# Patient Record
Sex: FEMALE | Race: WHITE | Employment: OTHER | URBAN - METROPOLITAN AREA
[De-identification: names, ages, dates, MRNs, and addresses within clinical notes are randomized per-mention and may not be internally consistent; named-entity substitution may affect disease eponyms.]

---

## 2019-08-21 ENCOUNTER — TRANSFERRED RECORDS (OUTPATIENT)
Dept: HEALTH INFORMATION MANAGEMENT | Facility: CLINIC | Age: 79
End: 2019-08-21

## 2019-08-21 LAB
CREAT SERPL-MCNC: 0.58 MG/DL (ref 0.59–1.04)
GFR SERPL CREATININE-BSD FRML MDRD: 87 ML/MIN/1.73M2
GLUCOSE SERPL-MCNC: 96 MG/DL (ref 70–140)
POTASSIUM SERPL-SCNC: 4.3 MMOL/L (ref 3.6–5.2)

## 2019-08-26 RX ORDER — ACETAMINOPHEN 325 MG/1
975 TABLET ORAL ONCE
Status: CANCELLED | OUTPATIENT
Start: 2019-08-26

## 2019-09-10 ENCOUNTER — ANESTHESIA EVENT (OUTPATIENT)
Dept: SURGERY | Facility: CLINIC | Age: 79
DRG: 483 | End: 2019-09-10
Payer: COMMERCIAL

## 2019-09-10 ASSESSMENT — ENCOUNTER SYMPTOMS: DYSRHYTHMIAS: 1

## 2019-09-11 ENCOUNTER — APPOINTMENT (OUTPATIENT)
Dept: GENERAL RADIOLOGY | Facility: CLINIC | Age: 79
DRG: 483 | End: 2019-09-11
Attending: ORTHOPAEDIC SURGERY
Payer: COMMERCIAL

## 2019-09-11 ENCOUNTER — HOSPITAL ENCOUNTER (INPATIENT)
Facility: CLINIC | Age: 79
LOS: 2 days | Discharge: HOME HEALTH | DRG: 483 | End: 2019-09-13
Attending: ORTHOPAEDIC SURGERY | Admitting: ORTHOPAEDIC SURGERY
Payer: COMMERCIAL

## 2019-09-11 ENCOUNTER — ANESTHESIA (OUTPATIENT)
Dept: SURGERY | Facility: CLINIC | Age: 79
DRG: 483 | End: 2019-09-11
Payer: COMMERCIAL

## 2019-09-11 DIAGNOSIS — Z98.890 STATUS POST SHOULDER SURGERY: Primary | ICD-10-CM

## 2019-09-11 DIAGNOSIS — I10 ESSENTIAL HYPERTENSION: ICD-10-CM

## 2019-09-11 LAB
ABO + RH BLD: NORMAL
ABO + RH BLD: NORMAL
BLD GP AB SCN SERPL QL: NORMAL
BLOOD BANK CMNT PATIENT-IMP: NORMAL
GLUCOSE SERPL-MCNC: 99 MG/DL (ref 70–99)
SPECIMEN EXP DATE BLD: NORMAL

## 2019-09-11 PROCEDURE — 82947 ASSAY GLUCOSE BLOOD QUANT: CPT | Performed by: ANESTHESIOLOGY

## 2019-09-11 PROCEDURE — 25800030 ZZH RX IP 258 OP 636: Performed by: NURSE ANESTHETIST, CERTIFIED REGISTERED

## 2019-09-11 PROCEDURE — 25800030 ZZH RX IP 258 OP 636: Performed by: ORTHOPAEDIC SURGERY

## 2019-09-11 PROCEDURE — 86900 BLOOD TYPING SEROLOGIC ABO: CPT | Performed by: ORTHOPAEDIC SURGERY

## 2019-09-11 PROCEDURE — 99207 ZZC CONSULT E&M CHANGED TO INITIAL LEVEL: CPT | Performed by: INTERNAL MEDICINE

## 2019-09-11 PROCEDURE — 12000001 ZZH R&B MED SURG/OB UMMC

## 2019-09-11 PROCEDURE — 36000062 ZZH SURGERY LEVEL 4 1ST 30 MIN - UMMC: Performed by: ORTHOPAEDIC SURGERY

## 2019-09-11 PROCEDURE — C1713 ANCHOR/SCREW BN/BN,TIS/BN: HCPCS | Performed by: ORTHOPAEDIC SURGERY

## 2019-09-11 PROCEDURE — 25800025 ZZH RX 258: Performed by: ORTHOPAEDIC SURGERY

## 2019-09-11 PROCEDURE — 37000008 ZZH ANESTHESIA TECHNICAL FEE, 1ST 30 MIN: Performed by: ORTHOPAEDIC SURGERY

## 2019-09-11 PROCEDURE — 25000125 ZZHC RX 250: Performed by: ORTHOPAEDIC SURGERY

## 2019-09-11 PROCEDURE — 25000128 H RX IP 250 OP 636: Performed by: ORTHOPAEDIC SURGERY

## 2019-09-11 PROCEDURE — 25000125 ZZHC RX 250: Performed by: NURSE ANESTHETIST, CERTIFIED REGISTERED

## 2019-09-11 PROCEDURE — 25800030 ZZH RX IP 258 OP 636: Performed by: ANESTHESIOLOGY

## 2019-09-11 PROCEDURE — 25000132 ZZH RX MED GY IP 250 OP 250 PS 637: Performed by: ORTHOPAEDIC SURGERY

## 2019-09-11 PROCEDURE — 25000566 ZZH SEVOFLURANE, EA 15 MIN: Performed by: ORTHOPAEDIC SURGERY

## 2019-09-11 PROCEDURE — 40000986 XR SHOULDER RT PORT G/E 2 VW: Mod: RT

## 2019-09-11 PROCEDURE — 86850 RBC ANTIBODY SCREEN: CPT | Performed by: ORTHOPAEDIC SURGERY

## 2019-09-11 PROCEDURE — 25000128 H RX IP 250 OP 636: Performed by: ANESTHESIOLOGY

## 2019-09-11 PROCEDURE — 37000009 ZZH ANESTHESIA TECHNICAL FEE, EACH ADDTL 15 MIN: Performed by: ORTHOPAEDIC SURGERY

## 2019-09-11 PROCEDURE — C9290 INJ, BUPIVACAINE LIPOSOME: HCPCS | Performed by: ANESTHESIOLOGY

## 2019-09-11 PROCEDURE — 71000014 ZZH RECOVERY PHASE 1 LEVEL 2 FIRST HR: Performed by: ORTHOPAEDIC SURGERY

## 2019-09-11 PROCEDURE — 25000132 ZZH RX MED GY IP 250 OP 250 PS 637: Performed by: ANESTHESIOLOGY

## 2019-09-11 PROCEDURE — 25000132 ZZH RX MED GY IP 250 OP 250 PS 637: Performed by: INTERNAL MEDICINE

## 2019-09-11 PROCEDURE — 99222 1ST HOSP IP/OBS MODERATE 55: CPT | Performed by: INTERNAL MEDICINE

## 2019-09-11 PROCEDURE — 36000064 ZZH SURGERY LEVEL 4 EA 15 ADDTL MIN - UMMC: Performed by: ORTHOPAEDIC SURGERY

## 2019-09-11 PROCEDURE — P9041 ALBUMIN (HUMAN),5%, 50ML: HCPCS | Performed by: NURSE ANESTHETIST, CERTIFIED REGISTERED

## 2019-09-11 PROCEDURE — 27110028 ZZH OR GENERAL SUPPLY NON-STERILE: Performed by: ORTHOPAEDIC SURGERY

## 2019-09-11 PROCEDURE — 0RRJ00Z REPLACEMENT OF RIGHT SHOULDER JOINT WITH REVERSE BALL AND SOCKET SYNTHETIC SUBSTITUTE, OPEN APPROACH: ICD-10-PCS | Performed by: ORTHOPAEDIC SURGERY

## 2019-09-11 PROCEDURE — 86901 BLOOD TYPING SEROLOGIC RH(D): CPT | Performed by: ORTHOPAEDIC SURGERY

## 2019-09-11 PROCEDURE — C1776 JOINT DEVICE (IMPLANTABLE): HCPCS | Performed by: ORTHOPAEDIC SURGERY

## 2019-09-11 PROCEDURE — 40000171 ZZH STATISTIC PRE-PROCEDURE ASSESSMENT III: Performed by: ORTHOPAEDIC SURGERY

## 2019-09-11 PROCEDURE — 27211024 ZZHC OR SUPPLY OTHER OPNP: Performed by: ORTHOPAEDIC SURGERY

## 2019-09-11 PROCEDURE — 27210794 ZZH OR GENERAL SUPPLY STERILE: Performed by: ORTHOPAEDIC SURGERY

## 2019-09-11 PROCEDURE — 25000128 H RX IP 250 OP 636: Performed by: NURSE ANESTHETIST, CERTIFIED REGISTERED

## 2019-09-11 DEVICE — IMP SCR LOCKING BIOM REV SHLDR 3.5 HEX 4.75X20MM 180551: Type: IMPLANTABLE DEVICE | Site: SHOULDER | Status: FUNCTIONAL

## 2019-09-11 DEVICE — IMP SCR LOCKING BIOM REV SHLDR 3.5 HEX 4.75X15MM 180550: Type: IMPLANTABLE DEVICE | Site: SHOULDER | Status: FUNCTIONAL

## 2019-09-11 DEVICE — IMPLANTABLE DEVICE: Type: IMPLANTABLE DEVICE | Site: SHOULDER | Status: FUNCTIONAL

## 2019-09-11 DEVICE — IMP BASEPLATE MINI GLENOSPHERE BIOM REV SHLDR 25MM 010000589: Type: IMPLANTABLE DEVICE | Site: SHOULDER | Status: FUNCTIONAL

## 2019-09-11 DEVICE — IMP SCR LOCKING BIOM REV SHLDR 3.5 HEX 4.75X35MM 180554: Type: IMPLANTABLE DEVICE | Site: SHOULDER | Status: FUNCTIONAL

## 2019-09-11 DEVICE — IMP SCR LOCKING BIOM REV SHLDR 3.5 HEX 4.75X30MM 180553: Type: IMPLANTABLE DEVICE | Site: SHOULDER | Status: FUNCTIONAL

## 2019-09-11 RX ORDER — BETAMETHASONE DIPROPIONATE 0.5 MG/G
CREAM TOPICAL 2 TIMES DAILY PRN
COMMUNITY

## 2019-09-11 RX ORDER — LIDOCAINE 40 MG/G
CREAM TOPICAL
Status: DISCONTINUED | OUTPATIENT
Start: 2019-09-11 | End: 2019-09-11 | Stop reason: HOSPADM

## 2019-09-11 RX ORDER — ONDANSETRON 2 MG/ML
4 INJECTION INTRAMUSCULAR; INTRAVENOUS EVERY 6 HOURS PRN
Status: DISCONTINUED | OUTPATIENT
Start: 2019-09-11 | End: 2019-09-13 | Stop reason: HOSPADM

## 2019-09-11 RX ORDER — GABAPENTIN 100 MG/1
200 CAPSULE ORAL
Status: COMPLETED | OUTPATIENT
Start: 2019-09-11 | End: 2019-09-11

## 2019-09-11 RX ORDER — CLOTRIMAZOLE 1 %
CREAM (GRAM) TOPICAL 2 TIMES DAILY PRN
COMMUNITY

## 2019-09-11 RX ORDER — AMOXICILLIN 250 MG
1 CAPSULE ORAL 2 TIMES DAILY
Status: DISCONTINUED | OUTPATIENT
Start: 2019-09-11 | End: 2019-09-13 | Stop reason: HOSPADM

## 2019-09-11 RX ORDER — SODIUM CHLORIDE, SODIUM LACTATE, POTASSIUM CHLORIDE, CALCIUM CHLORIDE 600; 310; 30; 20 MG/100ML; MG/100ML; MG/100ML; MG/100ML
INJECTION, SOLUTION INTRAVENOUS CONTINUOUS
Status: DISCONTINUED | OUTPATIENT
Start: 2019-09-11 | End: 2019-09-11 | Stop reason: HOSPADM

## 2019-09-11 RX ORDER — NALOXONE HYDROCHLORIDE 0.4 MG/ML
.1-.4 INJECTION, SOLUTION INTRAMUSCULAR; INTRAVENOUS; SUBCUTANEOUS
Status: DISCONTINUED | OUTPATIENT
Start: 2019-09-11 | End: 2019-09-13 | Stop reason: HOSPADM

## 2019-09-11 RX ORDER — ACETAMINOPHEN 325 MG/1
975 TABLET ORAL EVERY 8 HOURS
Status: DISCONTINUED | OUTPATIENT
Start: 2019-09-11 | End: 2019-09-13 | Stop reason: HOSPADM

## 2019-09-11 RX ORDER — ALBUMIN, HUMAN INJ 5% 5 %
SOLUTION INTRAVENOUS CONTINUOUS PRN
Status: DISCONTINUED | OUTPATIENT
Start: 2019-09-11 | End: 2019-09-11

## 2019-09-11 RX ORDER — METOCLOPRAMIDE 5 MG/1
5 TABLET ORAL EVERY 6 HOURS PRN
Status: DISCONTINUED | OUTPATIENT
Start: 2019-09-11 | End: 2019-09-13 | Stop reason: HOSPADM

## 2019-09-11 RX ORDER — AMOXICILLIN 250 MG
2 CAPSULE ORAL 2 TIMES DAILY
Status: DISCONTINUED | OUTPATIENT
Start: 2019-09-11 | End: 2019-09-13 | Stop reason: HOSPADM

## 2019-09-11 RX ORDER — SODIUM CHLORIDE 9 MG/ML
INJECTION, SOLUTION INTRAVENOUS CONTINUOUS
Status: DISCONTINUED | OUTPATIENT
Start: 2019-09-11 | End: 2019-09-13 | Stop reason: HOSPADM

## 2019-09-11 RX ORDER — LIDOCAINE 40 MG/G
CREAM TOPICAL
Status: DISCONTINUED | OUTPATIENT
Start: 2019-09-11 | End: 2019-09-13 | Stop reason: HOSPADM

## 2019-09-11 RX ORDER — HYDROMORPHONE HYDROCHLORIDE 1 MG/ML
.3-.5 INJECTION, SOLUTION INTRAMUSCULAR; INTRAVENOUS; SUBCUTANEOUS EVERY 10 MIN PRN
Status: DISCONTINUED | OUTPATIENT
Start: 2019-09-11 | End: 2019-09-11 | Stop reason: HOSPADM

## 2019-09-11 RX ORDER — OXYCODONE HYDROCHLORIDE 5 MG/1
5-10 TABLET ORAL EVERY 4 HOURS PRN
Status: DISCONTINUED | OUTPATIENT
Start: 2019-09-11 | End: 2019-09-12

## 2019-09-11 RX ORDER — DILTIAZEM HYDROCHLORIDE 180 MG/1
180 CAPSULE, EXTENDED RELEASE ORAL AT BEDTIME
Status: ON HOLD | COMMUNITY
Start: 2013-07-02 | End: 2019-09-13

## 2019-09-11 RX ORDER — ATORVASTATIN CALCIUM 20 MG/1
20 TABLET, FILM COATED ORAL AT BEDTIME
Status: DISCONTINUED | OUTPATIENT
Start: 2019-09-11 | End: 2019-09-13 | Stop reason: HOSPADM

## 2019-09-11 RX ORDER — DIMENHYDRINATE 50 MG/ML
25 INJECTION, SOLUTION INTRAMUSCULAR; INTRAVENOUS
Status: DISCONTINUED | OUTPATIENT
Start: 2019-09-11 | End: 2019-09-11 | Stop reason: HOSPADM

## 2019-09-11 RX ORDER — NALOXONE HYDROCHLORIDE 0.4 MG/ML
.1-.4 INJECTION, SOLUTION INTRAMUSCULAR; INTRAVENOUS; SUBCUTANEOUS
Status: DISCONTINUED | OUTPATIENT
Start: 2019-09-11 | End: 2019-09-11 | Stop reason: HOSPADM

## 2019-09-11 RX ORDER — FENTANYL CITRATE 50 UG/ML
INJECTION, SOLUTION INTRAMUSCULAR; INTRAVENOUS PRN
Status: DISCONTINUED | OUTPATIENT
Start: 2019-09-11 | End: 2019-09-11

## 2019-09-11 RX ORDER — CLOBETASOL PROPIONATE 0.5 MG/ML
SOLUTION TOPICAL DAILY PRN
COMMUNITY

## 2019-09-11 RX ORDER — ONDANSETRON 2 MG/ML
INJECTION INTRAMUSCULAR; INTRAVENOUS PRN
Status: DISCONTINUED | OUTPATIENT
Start: 2019-09-11 | End: 2019-09-11

## 2019-09-11 RX ORDER — IBUPROFEN 200 MG
400 TABLET ORAL 2 TIMES DAILY
COMMUNITY

## 2019-09-11 RX ORDER — ACETAMINOPHEN 325 MG/1
975 TABLET ORAL
Status: COMPLETED | OUTPATIENT
Start: 2019-09-11 | End: 2019-09-11

## 2019-09-11 RX ORDER — KETOCONAZOLE 20 MG/ML
SHAMPOO TOPICAL DAILY PRN
Refills: 10 | COMMUNITY
Start: 2019-08-13

## 2019-09-11 RX ORDER — HYDROXYZINE HYDROCHLORIDE 10 MG/1
10 TABLET, FILM COATED ORAL EVERY 6 HOURS PRN
Status: DISCONTINUED | OUTPATIENT
Start: 2019-09-11 | End: 2019-09-13 | Stop reason: HOSPADM

## 2019-09-11 RX ORDER — DEXAMETHASONE SODIUM PHOSPHATE 4 MG/ML
INJECTION, SOLUTION INTRA-ARTICULAR; INTRALESIONAL; INTRAMUSCULAR; INTRAVENOUS; SOFT TISSUE PRN
Status: DISCONTINUED | OUTPATIENT
Start: 2019-09-11 | End: 2019-09-11

## 2019-09-11 RX ORDER — ACETAMINOPHEN 325 MG/1
650 TABLET ORAL EVERY 4 HOURS PRN
Status: DISCONTINUED | OUTPATIENT
Start: 2019-09-14 | End: 2019-09-13 | Stop reason: HOSPADM

## 2019-09-11 RX ORDER — FLUMAZENIL 0.1 MG/ML
0.2 INJECTION, SOLUTION INTRAVENOUS
Status: DISCONTINUED | OUTPATIENT
Start: 2019-09-11 | End: 2019-09-11 | Stop reason: HOSPADM

## 2019-09-11 RX ORDER — BETAMETHASONE DIPROPIONATE 0.5 MG/G
CREAM TOPICAL
Refills: 3 | Status: ON HOLD | COMMUNITY
Start: 2019-04-02 | End: 2019-09-11

## 2019-09-11 RX ORDER — FENTANYL CITRATE 50 UG/ML
25-50 INJECTION, SOLUTION INTRAMUSCULAR; INTRAVENOUS
Status: DISCONTINUED | OUTPATIENT
Start: 2019-09-11 | End: 2019-09-11 | Stop reason: HOSPADM

## 2019-09-11 RX ORDER — DOXYCYCLINE 100 MG/1
CAPSULE ORAL
Refills: 0 | Status: ON HOLD | COMMUNITY
Start: 2019-05-07 | End: 2019-09-11

## 2019-09-11 RX ORDER — HYDRALAZINE HYDROCHLORIDE 20 MG/ML
2.5-5 INJECTION INTRAMUSCULAR; INTRAVENOUS EVERY 10 MIN PRN
Status: DISCONTINUED | OUTPATIENT
Start: 2019-09-11 | End: 2019-09-11 | Stop reason: HOSPADM

## 2019-09-11 RX ORDER — GLYCOPYRROLATE 0.2 MG/ML
INJECTION, SOLUTION INTRAMUSCULAR; INTRAVENOUS PRN
Status: DISCONTINUED | OUTPATIENT
Start: 2019-09-11 | End: 2019-09-11

## 2019-09-11 RX ORDER — ALBUTEROL SULFATE 0.83 MG/ML
2.5 SOLUTION RESPIRATORY (INHALATION) EVERY 4 HOURS PRN
Status: DISCONTINUED | OUTPATIENT
Start: 2019-09-11 | End: 2019-09-11 | Stop reason: HOSPADM

## 2019-09-11 RX ORDER — MEPERIDINE HYDROCHLORIDE 25 MG/ML
12.5 INJECTION INTRAMUSCULAR; INTRAVENOUS; SUBCUTANEOUS
Status: DISCONTINUED | OUTPATIENT
Start: 2019-09-11 | End: 2019-09-11 | Stop reason: HOSPADM

## 2019-09-11 RX ORDER — METOPROLOL TARTRATE 1 MG/ML
1-2 INJECTION, SOLUTION INTRAVENOUS EVERY 5 MIN PRN
Status: DISCONTINUED | OUTPATIENT
Start: 2019-09-11 | End: 2019-09-11 | Stop reason: HOSPADM

## 2019-09-11 RX ORDER — CEFAZOLIN SODIUM 1 G/3ML
1 INJECTION, POWDER, FOR SOLUTION INTRAMUSCULAR; INTRAVENOUS SEE ADMIN INSTRUCTIONS
Status: DISCONTINUED | OUTPATIENT
Start: 2019-09-11 | End: 2019-09-11 | Stop reason: HOSPADM

## 2019-09-11 RX ORDER — ONDANSETRON 4 MG/1
4 TABLET, ORALLY DISINTEGRATING ORAL EVERY 6 HOURS PRN
Status: DISCONTINUED | OUTPATIENT
Start: 2019-09-11 | End: 2019-09-13 | Stop reason: HOSPADM

## 2019-09-11 RX ORDER — DIPHENHYDRAMINE HCL 25 MG
25 TABLET ORAL
Status: ON HOLD | COMMUNITY
Start: 2016-08-24 | End: 2019-09-11

## 2019-09-11 RX ORDER — ONDANSETRON 2 MG/ML
4 INJECTION INTRAMUSCULAR; INTRAVENOUS EVERY 30 MIN PRN
Status: DISCONTINUED | OUTPATIENT
Start: 2019-09-11 | End: 2019-09-11 | Stop reason: HOSPADM

## 2019-09-11 RX ORDER — ATORVASTATIN CALCIUM 20 MG/1
20 TABLET, FILM COATED ORAL AT BEDTIME
COMMUNITY
Start: 2018-10-31

## 2019-09-11 RX ORDER — ONDANSETRON 4 MG/1
4 TABLET, ORALLY DISINTEGRATING ORAL EVERY 30 MIN PRN
Status: DISCONTINUED | OUTPATIENT
Start: 2019-09-11 | End: 2019-09-11 | Stop reason: HOSPADM

## 2019-09-11 RX ORDER — BUPIVACAINE HYDROCHLORIDE 2.5 MG/ML
INJECTION, SOLUTION EPIDURAL; INFILTRATION; INTRACAUDAL PRN
Status: DISCONTINUED | OUTPATIENT
Start: 2019-09-11 | End: 2019-09-11

## 2019-09-11 RX ORDER — CLOTRIMAZOLE 1 %
CREAM (GRAM) TOPICAL
Refills: 0 | Status: ON HOLD | COMMUNITY
Start: 2019-01-28 | End: 2019-09-11

## 2019-09-11 RX ORDER — CEFAZOLIN SODIUM 1 G/3ML
1 INJECTION, POWDER, FOR SOLUTION INTRAMUSCULAR; INTRAVENOUS EVERY 8 HOURS
Status: COMPLETED | OUTPATIENT
Start: 2019-09-11 | End: 2019-09-12

## 2019-09-11 RX ORDER — DOCUSATE SODIUM 100 MG/1
100 CAPSULE, LIQUID FILLED ORAL
Status: ON HOLD | COMMUNITY
Start: 2016-08-24 | End: 2019-09-11

## 2019-09-11 RX ORDER — OLOPATADINE HYDROCHLORIDE 2 MG/ML
SOLUTION/ DROPS OPHTHALMIC
Status: ON HOLD | COMMUNITY
Start: 2014-05-14 | End: 2019-09-11

## 2019-09-11 RX ORDER — METOCLOPRAMIDE HYDROCHLORIDE 5 MG/ML
5 INJECTION INTRAMUSCULAR; INTRAVENOUS EVERY 6 HOURS PRN
Status: DISCONTINUED | OUTPATIENT
Start: 2019-09-11 | End: 2019-09-13 | Stop reason: HOSPADM

## 2019-09-11 RX ORDER — ACETAMINOPHEN 500 MG
500 TABLET ORAL 2 TIMES DAILY
Status: ON HOLD | COMMUNITY
Start: 2017-05-08 | End: 2019-09-13

## 2019-09-11 RX ORDER — LIDOCAINE HYDROCHLORIDE 20 MG/ML
INJECTION, SOLUTION INFILTRATION; PERINEURAL PRN
Status: DISCONTINUED | OUTPATIENT
Start: 2019-09-11 | End: 2019-09-11

## 2019-09-11 RX ORDER — CEFAZOLIN SODIUM 2 G/100ML
2 INJECTION, SOLUTION INTRAVENOUS
Status: COMPLETED | OUTPATIENT
Start: 2019-09-11 | End: 2019-09-11

## 2019-09-11 RX ORDER — PROPOFOL 10 MG/ML
INJECTION, EMULSION INTRAVENOUS PRN
Status: DISCONTINUED | OUTPATIENT
Start: 2019-09-11 | End: 2019-09-11

## 2019-09-11 RX ORDER — ESTRADIOL 0.1 MG/G
2 CREAM VAGINAL DAILY PRN
COMMUNITY
Start: 2018-02-26

## 2019-09-11 RX ORDER — ASPIRIN 81 MG/1
81 TABLET ORAL DAILY
COMMUNITY
Start: 2015-10-20

## 2019-09-11 RX ORDER — CLOBETASOL PROPIONATE 0.5 MG/ML
SOLUTION TOPICAL
Refills: 3 | Status: ON HOLD | COMMUNITY
Start: 2019-04-02 | End: 2019-09-11

## 2019-09-11 RX ORDER — DILTIAZEM HYDROCHLORIDE 120 MG/1
120 CAPSULE, EXTENDED RELEASE ORAL AT BEDTIME
Status: DISCONTINUED | OUTPATIENT
Start: 2019-09-11 | End: 2019-09-13 | Stop reason: HOSPADM

## 2019-09-11 RX ORDER — IBUPROFEN 600 MG/1
600 TABLET, FILM COATED ORAL EVERY 6 HOURS PRN
Status: DISCONTINUED | OUTPATIENT
Start: 2019-09-11 | End: 2019-09-13 | Stop reason: HOSPADM

## 2019-09-11 RX ADMIN — OXYCODONE HYDROCHLORIDE 5 MG: 5 TABLET ORAL at 20:50

## 2019-09-11 RX ADMIN — SODIUM CHLORIDE: 9 INJECTION, SOLUTION INTRAVENOUS at 15:30

## 2019-09-11 RX ADMIN — PROPOFOL 50 MG: 10 INJECTION, EMULSION INTRAVENOUS at 10:03

## 2019-09-11 RX ADMIN — OXYCODONE HYDROCHLORIDE 5 MG: 5 TABLET ORAL at 15:30

## 2019-09-11 RX ADMIN — GLYCOPYRROLATE 0.2 MG: 0.2 INJECTION, SOLUTION INTRAMUSCULAR; INTRAVENOUS at 10:42

## 2019-09-11 RX ADMIN — ALBUMIN HUMAN: 0.05 INJECTION, SOLUTION INTRAVENOUS at 11:15

## 2019-09-11 RX ADMIN — CEFAZOLIN SODIUM 1 G: 1 INJECTION, POWDER, FOR SOLUTION INTRAMUSCULAR; INTRAVENOUS at 19:40

## 2019-09-11 RX ADMIN — ATORVASTATIN CALCIUM 20 MG: 20 TABLET, FILM COATED ORAL at 20:50

## 2019-09-11 RX ADMIN — BENZOCAINE, MENTHOL 1 LOZENGE: 15; 3.6 LOZENGE ORAL at 20:52

## 2019-09-11 RX ADMIN — BENZOCAINE, MENTHOL 1 LOZENGE: 15; 3.6 LOZENGE ORAL at 16:54

## 2019-09-11 RX ADMIN — FENTANYL CITRATE 50 MCG: 50 INJECTION INTRAMUSCULAR; INTRAVENOUS at 08:51

## 2019-09-11 RX ADMIN — DEXMEDETOMIDINE HYDROCHLORIDE 20 MCG: 100 INJECTION, SOLUTION INTRAVENOUS at 12:12

## 2019-09-11 RX ADMIN — CEFAZOLIN 1 G: 1 INJECTION, POWDER, FOR SOLUTION INTRAMUSCULAR; INTRAVENOUS at 12:04

## 2019-09-11 RX ADMIN — SODIUM CHLORIDE 1 G: 9 INJECTION, SOLUTION INTRAVENOUS at 10:10

## 2019-09-11 RX ADMIN — ACETAMINOPHEN 975 MG: 325 TABLET, FILM COATED ORAL at 07:44

## 2019-09-11 RX ADMIN — SODIUM CHLORIDE, POTASSIUM CHLORIDE, SODIUM LACTATE AND CALCIUM CHLORIDE: 600; 310; 30; 20 INJECTION, SOLUTION INTRAVENOUS at 09:49

## 2019-09-11 RX ADMIN — FENTANYL CITRATE 50 MCG: 50 INJECTION, SOLUTION INTRAMUSCULAR; INTRAVENOUS at 10:07

## 2019-09-11 RX ADMIN — Medication 100 MG: at 09:58

## 2019-09-11 RX ADMIN — PHENYLEPHRINE HYDROCHLORIDE: 10 INJECTION INTRAVENOUS at 10:56

## 2019-09-11 RX ADMIN — BUPIVACAINE 10 ML: 13.3 INJECTION, SUSPENSION, LIPOSOMAL INFILTRATION at 09:14

## 2019-09-11 RX ADMIN — CEFAZOLIN SODIUM 2 G: 2 INJECTION, SOLUTION INTRAVENOUS at 10:05

## 2019-09-11 RX ADMIN — PHENYLEPHRINE HYDROCHLORIDE 0.2 MCG/KG/MIN: 10 INJECTION INTRAVENOUS at 10:24

## 2019-09-11 RX ADMIN — ACETAMINOPHEN 975 MG: 325 TABLET, FILM COATED ORAL at 15:30

## 2019-09-11 RX ADMIN — BUPIVACAINE HYDROCHLORIDE 10 ML: 2.5 INJECTION, SOLUTION EPIDURAL; INFILTRATION; INTRACAUDAL at 09:14

## 2019-09-11 RX ADMIN — FENTANYL CITRATE 50 MCG: 50 INJECTION, SOLUTION INTRAMUSCULAR; INTRAVENOUS at 09:58

## 2019-09-11 RX ADMIN — PROPOFOL 120 MG: 10 INJECTION, EMULSION INTRAVENOUS at 09:58

## 2019-09-11 RX ADMIN — ALBUMIN HUMAN: 0.05 INJECTION, SOLUTION INTRAVENOUS at 11:38

## 2019-09-11 RX ADMIN — DILTIAZEM HYDROCHLORIDE 120 MG: 120 CAPSULE, COATED, EXTENDED RELEASE ORAL at 22:09

## 2019-09-11 RX ADMIN — LIDOCAINE HYDROCHLORIDE 100 MG: 20 INJECTION, SOLUTION INFILTRATION; PERINEURAL at 09:58

## 2019-09-11 RX ADMIN — GABAPENTIN 200 MG: 100 CAPSULE ORAL at 07:44

## 2019-09-11 RX ADMIN — DEXAMETHASONE SODIUM PHOSPHATE 4 MG: 4 INJECTION, SOLUTION INTRAMUSCULAR; INTRAVENOUS at 10:30

## 2019-09-11 RX ADMIN — ONDANSETRON 4 MG: 2 INJECTION INTRAMUSCULAR; INTRAVENOUS at 11:57

## 2019-09-11 ASSESSMENT — ACTIVITIES OF DAILY LIVING (ADL)
AMBULATION: 0-->INDEPENDENT
TOILETING: 0-->INDEPENDENT
COGNITION: 0 - NO COGNITION ISSUES REPORTED
ADLS_ACUITY_SCORE: 14
DRESS: 0-->INDEPENDENT
RETIRED_COMMUNICATION: 0-->UNDERSTANDS/COMMUNICATES WITHOUT DIFFICULTY
ADLS_ACUITY_SCORE: 13
BATHING: 0-->INDEPENDENT
TRANSFERRING: 0-->INDEPENDENT
RETIRED_EATING: 0-->INDEPENDENT
SWALLOWING: 0-->SWALLOWS FOODS/LIQUIDS WITHOUT DIFFICULTY
FALL_HISTORY_WITHIN_LAST_SIX_MONTHS: NO

## 2019-09-11 ASSESSMENT — COPD QUESTIONNAIRES: COPD: 1

## 2019-09-11 ASSESSMENT — MIFFLIN-ST. JEOR: SCORE: 1204

## 2019-09-11 NOTE — ANESTHESIA CARE TRANSFER NOTE
Patient: Jocelin Moreno    Procedure(s):  Reverse Total Shoulder Arthroplasty    Diagnosis: Glenohumeral Osteoarthritis  Diagnosis Additional Information: No value filed.    Anesthesia Type:   General, Peripheral Nerve Block     Note:  Airway :Face Mask  Patient transferred to:PACU  Comments: 108/51, sat 99%, HR 66, RR 14, T 36.1Handoff Report: Identifed the Patient, Identified the Reponsible Provider, Reviewed the pertinent medical history, Discussed the surgical course, Reviewed Intra-OP anesthesia mangement and issues during anesthesia, Set expectations for post-procedure period and Allowed opportunity for questions and acknowledgement of understanding      Vitals: (Last set prior to Anesthesia Care Transfer)    CRNA VITALS  9/11/2019 1159 - 9/11/2019 1242      9/11/2019             EKG:  Sinus rhythm                Electronically Signed By: JAKY Mendieta CRNA  September 11, 2019  12:42 PM

## 2019-09-11 NOTE — PLAN OF CARE
Pt arrived to unit at 1400 and was oriented to room and call light  VS: VSS   O2: >90% on RA   Output: Adequate in BR; last    Last BM: 9/10   Activity: 1A. Ambulated in room   Up for meals? Yes.   Skin: Incision, drain R shoulder. Bruises L FA and posterior knee.   Pain: 5mg oxycodone x1   CMS: Numbness RUE   Dressing: Aquacel CDI   Diet: Regular   LDA: 2 L PIVs, HV    Equipment: PCDs, sling, IV pole, capnography   Plan: TBD   Additional Info: Pt received general anesthesia + bupivicaine and Exparel block. .

## 2019-09-11 NOTE — ANESTHESIA PREPROCEDURE EVALUATION
Anesthesia Pre-Procedure Evaluation    Patient: Jocelin Moreno   MRN:     [id number] Gender:   female   Age:    79 year old :      1940        Preoperative Diagnosis: Glenohumeral Osteoarthritis   Procedure(s):  Right Total Shoulder Arthroplasty  Versus Reverse Total Shoulder Arthroplasty     History reviewed. No pertinent past medical history.   History reviewed. No pertinent surgical history.       Anesthesia Evaluation     . Pt has had prior anesthetic. Type: General, MAC and Regional    No history of anesthetic complications          ROS/MED HX    ENT/Pulmonary:     (+)COPD (states she has had pneumonia and URIs in past), , . .    Neurologic:  - neg neurologic ROS     Cardiovascular:     (+) Dyslipidemia, hypertension--CAD (per scan.  No interventions), --. Taking blood thinners : Instructions Given to patient: ASA only. . . :. dysrhythmias a-fib and a-flutter, . Previous cardiac testing Echodate:2018results:Mild diastolic dysfunctiondate: results:ECG reviewed date:19 results:SB 56 1* AVB date: results:          METS/Exercise Tolerance:  >4 METS   Hematologic:  - neg hematologic  ROS       Musculoskeletal: Comment: S/P B TKA and L TSA  (+) arthritis,  -       GI/Hepatic:  - neg GI/hepatic ROS       Renal/Genitourinary:  - ROS Renal section negative       Endo:  - neg endo ROS       Psychiatric:  - neg psychiatric ROS       Infectious Disease:  - neg infectious disease ROS       Malignancy:      - no malignancy   Other: Comment: [place]  1 wine/ d     (+) no H/O Chronic Pain,                       PHYSICAL EXAM:   Mental Status/Neuro: A/A/O   Airway: Facies: Feasible  Mallampati: I  Mouth/Opening: Full  TM distance: > 6 cm  Neck ROM: Full   Respiratory: Auscultation: CTAB     Resp. Rate: Normal     Resp. Effort: Normal      CV: Rhythm: Regular  Rate: Age appropriate  Heart: Normal Sounds  Edema: None   Comments:      Dental: Normal Dentition                LABS:  CBC: No results found for: WBC, HGB, HCT,  PLT  BMP:   Lab Results   Component Value Date    POTASSIUM 4.3 08/21/2019    CR 0.58 (A) 08/21/2019    GLC 96 08/21/2019     COAGS: No results found for: PTT, INR, FIBR  POC: No results found for: BGM, HCG, HCGS  OTHER: No results found for: PH, LACT, A1C, LAST, PHOS, MAG, ALBUMIN, PROTTOTAL, ALT, AST, GGT, ALKPHOS, BILITOTAL, BILIDIRECT, LIPASE, AMYLASE, MILEY, TSH, T4, T3, CRP, SED     Preop Vitals    BP Readings from Last 3 Encounters:   No data found for BP    Pulse Readings from Last 3 Encounters:   No data found for Pulse      Resp Readings from Last 3 Encounters:   No data found for Resp    SpO2 Readings from Last 3 Encounters:   No data found for SpO2      Temp Readings from Last 1 Encounters:   No data found for Temp    Ht Readings from Last 1 Encounters:   No data found for Ht      Wt Readings from Last 1 Encounters:   No data found for Wt    There is no height or weight on file to calculate BMI.     LDA:        Assessment:   ASA SCORE: 2    H&P: History and physical reviewed and following examination; no interval change.   Smoking Status:  Non-Smoker/Unknown   NPO Status: NPO Appropriate     Plan:   Anes. Type:  General; Peripheral Nerve Block   Pre-Medication: Gabapentin; Acetaminophen   Induction:  IV (Standard)   Airway: ETT; Oral   Access/Monitoring: PIV   Maintenance: Balanced     Postop Plan:   Postop Pain: Opioids  Postop Sedation/Airway: Not planned  Disposition: Inpatient/Admit     PONV Management:   Adult Risk Factors: Female, Non-Smoker, Postop Opioids   Prevention: Ondansetron, Dexamethasone     CONSENT: Direct conversation   Plan and risks discussed with: Patient   Blood Products: Consent Deferred (Minimal Blood Loss)       Comments for Plan/Consent:  12/04/15; 1027; BVM; Ventilated by mask (1); Direct laryngoscopy; 7; Landa; 2; Oral; 1; 1; Auscultation, Capnometry; 20 cm                 Kassi Daley MD

## 2019-09-11 NOTE — CONSULTS
HOSPITALIST CONSULTATION     REQUESTING PHYSICIAN: Lonnie Scales MD    REASON FOR CONSULTATION: Evaluation, Recommendations and Co-management of Medical Comorbidities.     ASSESSMENT & PLAN :     Jocelin Moreno is a 79 year old female admitted on 9/11/2019 s/p:     Pre-operative diagnosis:         Glenohumeral Osteoarthritis    Procedure:      Procedure(s):  Reverse Total Shoulder Arthroplasty  Surgeon:         Surgeon(s) and Role:     * Lonnie Scales MD - Primary    Estimated blood loss:  270 mL  Complications:            None.      PMH, Pre Op eval reviewed.   Currently doing well. Pain controlled.   Denies cp or sob. No LH or dizziness. No NV.  Family at bedside.    Medical issues:    #History of hypertension:  #Diastolic dysfunction: Stable  #History of known atrial flutter, resolved spontaneously.  Not on anticoagulation.  #Normal stress test 2015.  Currently asymptomatic.  Hemodynamics stable.  -Continue home diltiazem, reduced dose 120 mg at bedtime.  -Continue statin  -Resume aspirin 81, when safe from bleeding standpoint.  -Monitor vitals.    #History of COPD: Stable.  Continue home inhalers.  -Encourage incentive spirometry.    #No other significant medical history.      # Orthopedic Surgery:     Post Op Management per Primary team.     Hemodynamics: stable. Continue on gentle IV fluids, until adequate PO. Monitor I/o.   Post op capnography per protocol.     Anemia of acute blood loss: Monitor hgb for anemia of acute blood loss. Transfuse for hgb <7.0    Pain control- per Primary team and/or Pain team.    Minimize use of narcotics as able. Consider bowel regimen with narcotics.     DVT prophylaxis- per Primary team  Activity, antibiotics, wound and/or drain care - per Primary team.         Thank you for the consultation. Please page with any question. Hospitalist team to follow.      Popeye Castro MD   Hospitalist, Internal Medicine  Pager:  [phone].     CHIEF COMPLAINT: s/p reverse TSA  - doing well.     HISTORY OF PRESENT ILLNESS: Obtained from the patient and chart review including Pre op evaluation, procedure note.    79 year old year old female  with above discussed medical problems s/p above procedure admitted on 9/11/2019  for post op care and monitoring  (for further details for indication of surgery and operative note, please refer to Lonnie Scales MD note). Medicine consulted to evaluate, recommend and/or co manage medical co morbidities.   No documented hypotension, hypoxemia or other significant complications intra or post operative.   Currently: Incisional Pain controlled.  Denies any chest pain, shortness of breath or LH or palpitations. Denies any nausea, vomiting or pain abdomen. No fever or chills. Denies any dysuria.  Denies any new rash.   Medical issues as discussed above.   Denies any other medical concern.     PAST MEDICAL HISTORY:     #History of hypertension:  #Diastolic dysfunction: Stable  #History of known atrial flutter, resolved spontaneously.  Not on anticoagulation.  # COPD    PAST SURGICAL HISTORY:   History reviewed. No pertinent surgical history.    FH: reviewed.     History reviewed. No pertinent family history.     SH: reviewed.     Social History     Socioeconomic History     Marital status:      Spouse name: None     Number of children: None     Years of education: None     Highest education level: None   Occupational History     None   Social Needs     Financial resource strain: None     Food insecurity:     Worry: None     Inability: None     Transportation needs:     Medical: None     Non-medical: None   Tobacco Use     Smoking status: Former Smoker     Smokeless tobacco: Never Used   Substance and Sexual Activity     Alcohol use: None     Comment: social      Drug use: Never     Sexual activity: None   Lifestyle     Physical activity:     Days per week: None     Minutes per session: None      Stress: None   Relationships     Social connections:     Talks on phone: None     Gets together: None     Attends Church service: None     Active member of club or organization: None     Attends meetings of clubs or organizations: None     Relationship status: None     Intimate partner violence:     Fear of current or ex partner: None     Emotionally abused: None     Physically abused: None     Forced sexual activity: None   Other Topics Concern     None   Social History Narrative     None       ALLERGIES:   Allergies   Allergen Reactions     Lorazepam Other (See Comments)     hallucinations     Chromium Rash         HOME MEDICATIONS:     Prior to Admission medications    Medication Sig Start Date End Date Taking? Authorizing Provider   acetaminophen (TYLENOL) 500 MG tablet Take 500 mg by mouth as needed 5/8/17  Yes Reported, Patient   aspirin 81 MG EC tablet  10/20/15  Yes Reported, Patient   atorvastatin (LIPITOR) 20 MG tablet Take 20 mg by mouth 10/31/18  Yes Reported, Patient   betamethasone dipropionate (DIPROSONE) 0.05 % external cream APPLY BID TO AREAS OF ITCH AND RASH ON THE BACK UNTIL RESOLVED 4/2/19  Yes Reported, Patient   clobetasol (TEMOVATE) 0.05 % external solution APPLY TO AREAS OF RASH ON SCALP ONCE D UNTIL CLEAR 4/2/19  Yes Reported, Patient   clotrimazole (LOTRIMIN) 1 % external cream CHELSIE EXT AA BID FOR 14 DAYS 1/28/19  Yes Reported, Patient   diltiazem ER (DILT-XR) 180 MG 24 hr capsule Take 180 mg by mouth 7/2/13  Yes Reported, Patient   estradiol (ESTRACE) 0.1 MG/GM vaginal cream Place 2 g vaginally 2/26/18  Yes Reported, Patient   ibuprofen (ADVIL/MOTRIN) 200 MG tablet Take 400 mg by mouth   Yes Reported, Patient   ketoconazole (NIZORAL) 2 % external shampoo  8/13/19  Yes Reported, Patient   olopatadine (PATADAY) 0.2 % ophthalmic solution INSTILL 1 DROP INTO EACH EYE 5/14/14  Yes Reported, Patient   diphenhydrAMINE (BENADRYL) 25 MG tablet Take 25 mg by mouth 8/24/16   Reported, Patient  "  docusate sodium (COLACE) 100 MG capsule Take 100 mg by mouth 8/24/16   Reported, Patient   doxycycline hyclate (VIBRAMYCIN) 100 MG capsule  5/7/19   Reported, Patient   Ibuprofen-diphenhydrAMINE HCl 200-25 MG CAPS Take 400 mg by mouth    Reported, Patient       CURRENT MEDICATIONS:    Current Facility-Administered Medications   Medication     [START ON 9/14/2019] acetaminophen (TYLENOL) tablet 650 mg     acetaminophen (TYLENOL) tablet 975 mg     bupivacaine liposome (EXPAREL) LONG ACTING injection was administered into the infiltration site to produce postsurgical analgesia. Duration of action is up to 72 hours, and other \"cosmo\" medications should not be given for 96 hours with the exception of the lidocaine 5% patch (LIDODERM) and the lidocaine 10mg in potassium infusions. This entry is for INFORMATION ONLY.     ceFAZolin (ANCEF) 1 g vial to attach to  ml bag for ADULT or 50 ml bag for PEDS     hydrOXYzine (ATARAX) tablet 10 mg     ibuprofen (ADVIL/MOTRIN) tablet 600 mg     lidocaine (LMX4) cream     lidocaine 1 % 0.1-1 mL     menthol (ICY HOT) 5 % patch 1 patch    And     menthol (ICY HOT) Patch in Place    And     [START ON 9/12/2019] menthol (ICY HOT) patch REMOVAL     metoclopramide (REGLAN) tablet 5 mg    Or     metoclopramide (REGLAN) injection 5 mg     naloxone (NARCAN) injection 0.1-0.4 mg     ondansetron (ZOFRAN-ODT) ODT tab 4 mg    Or     ondansetron (ZOFRAN) injection 4 mg     oxyCODONE (ROXICODONE) tablet 5-10 mg     senna-docusate (SENOKOT-S/PERICOLACE) 8.6-50 MG per tablet 1 tablet    Or     senna-docusate (SENOKOT-S/PERICOLACE) 8.6-50 MG per tablet 2 tablet     sodium chloride (PF) 0.9% PF flush 3 mL     sodium chloride (PF) 0.9% PF flush 3 mL     sodium chloride 0.9% infusion         ROS: 10 point ROS neg other than the symptoms noted above in the HPI.    PHYSICAL EXAMINATION:     /46 (BP Location: Right leg)   Pulse 54   Temp 96.1  F (35.6  C) (Oral)   Resp 14   Ht 1.626 m (5' " "4\")   Wt 75.4 kg (166 lb 3.6 oz)   SpO2 96%   BMI 28.53 kg/m    Temp (24hrs), Av.8  F (36.6  C), Min:96.1  F (35.6  C), Max:98.4  F (36.9  C)      BMI= Body mass index is 28.53 kg/m .      Intake/Output Summary (Last 24 hours) at 2019 1517  Last data filed at 2019 1350  Gross per 24 hour   Intake 1530 ml   Output 285 ml   Net 1245 ml       General: Alert, interactive, NAD.   HEENT: AT/NC. Anicteric.Moist MM.    Neck: Supple. No JVD appreciated.   Heart/CVS: Normal S1 and S2. Regular.   Chest/Respi: Non labored breathing. CTA BL.   Abdomen/GI: Soft, non distended, non tender. No g/r.  Extremities/MSK: Distal pulses 2+, well perfused. Rest per ortho.   Neuro: Alert and oriented x4. Cranial nerves II-XII are grossly intact.    Skin:  No new rash over exposed areas.       LABORATORY DATA: reviewed.     Recent Results (from the past 24 hour(s))   ABO/Rh type and screen    Collection Time: 19  7:20 AM   Result Value Ref Range    ABO O     RH(D) Pos     Antibody Screen Neg     Test Valid Only At          [facility]    Specimen Expires 2019    Glucose    Collection Time: 19  7:20 AM   Result Value Ref Range    Glucose 99 70 - 99 mg/dL       Recent Results (from the past 24 hour(s))   POC US Guidance Needle Placement    Impression    Right interscalene nerve block   XR Shoulder Right Port G/E 2 Views    Narrative    EXAM: XR SHOULDER RT PORT G/E 2 VW  2019 1:27 PM      HISTORY: s/p reverse tsa    COMPARISON: Outside radiographs 2019    FINDINGS: Portable postoperative AP and Grashey views of the right  shoulder.    There is a new reverse total right shoulder arthroplasty. Components  appear well seated. Single surgical drain noted. Postsurgical  subcutaneous gas. Surgical clips project over the axilla.    The visualized right lung is grossly clear.       Impression    IMPRESSION: New reverse right shoulder arthroplasty.    GAMBOA (Joe) " MD Popeye MCCAULEY MD

## 2019-09-11 NOTE — BRIEF OP NOTE
[facility], [place]    Brief Operative Note    Pre-operative diagnosis: Glenohumeral Osteoarthritis  Post-operative diagnosis * No post-op diagnosis entered *  Procedure: Procedure(s):  Reverse Total Shoulder Arthroplasty  Surgeon: Surgeon(s) and Role:     * Lonnie Scales MD - Primary     * Jimy Sanchez MD - Fellow - Assisting  Anesthesia: Other   Estimated blood loss: 270 mL  Drains: Hemovac  Specimens: * No specimens in log *  Findings:   None.  Complications: None.  Implants:    Implant Name Type Inv. Item Serial No.  Lot No. LRB No. Used   IMP BASEPLATE MINI GLENOSPHERE BIOM REV SHLDR 25MM [id number] Total Joint Component/Insert IMP BASEPLATE MINI GLENOSPHERE BIOM REV SHLDR 25MM [id number]  [employer] [id number] Right 1   Comprehensive Reverse Shoulder Central Screw    BIOMET [id number] Right 1   IMP SCR LOCKING BIOM REV SHLDR 3.5 HEX [id number] Metallic Hardware/[place] IMP SCR LOCKING BIOM REV SHLDR 3.5 HEX [id number]  [employer] [id number] Right 1   IMP SCR LOCKING BIOM REV SHLDR 3.5 HEX [id number] Metallic Hardware/[place] IMP SCR LOCKING BIOM REV SHLDR 3.5 HEX [id number] 004525  [employer] [id number] Right 1   IMP SCR LOCKING BIOM REV SHLDR 3.5 HEX [id number] Metallic Hardware/[place] IMP SCR LOCKING BIOM REV SHLDR 3.5 HEX [id number]  [employer] [id number] Right 1   IMP SCR LOCKING BIOM REV SHLDR 3.5 HEX [id number] [id number] Total Joint Component/Insert IMP SCR LOCKING BIOM REV SHLDR 3.5 HEX [id number] 505968  [employer] 116862 Right 1   Comprehensive Reverse Shoulder System [place] Standard Offset    BIOMET [id number] Right 1   Comprehensive Shoulder System Micro Length Porous Plasma Stem    [employer]ET [id number] Right 1   Comprehensive Reverse Shoulder System Highly Crosslinked Polyethylene Bearing Standard    BIOMET [id number] Right 1   Comprehensive Reverse Shoulder System Mini Humeral Tray    BIOMET [id number] Right 1

## 2019-09-11 NOTE — OR NURSING
PACU to Inpatient Nursing Handoff    Patient Jocelin Moreno is a 79 year old female who speaks English.   Procedure Procedure(s):  Reverse Total Shoulder Arthroplasty   Surgeon(s) Primary: Lonnie Scales MD  Fellow - Assisting: Jimy Sanchez MD     Allergies   Allergen Reactions     Lorazepam Other (See Comments)     hallucinations     Chromium Rash       Isolation  [unfilled]     Past Medical History   has no past medical history on file.    Anesthesia Other   Dermatome Level     Preop Meds acetaminophen (Tylenol) - time given: 0744  gabapentin (Neurontin) - time given: 0744   Nerve block Interscalene.  Location:right. Med:bupivacaine and Exparel (liposomal bupivacaine). Time given: 0914   Intraop Meds dexamethasone (Decadron)  dexmedetomidine (Precedex): 20 mcg total  fentanyl (Sublimaze): 100 mcg total  ondansetron (Zofran): last given at 1030  robinual 0.2 mg @1042,TXA 1 G @ 1010, Phenylephrine drip  2.27 @1154   Local Meds No   Antibiotics cefazolin (Ancef) - last given at 1204     Pain Patient Currently in Pain: denies  Comfort: negligible pain  Pain Control: fully effective   PACU meds  Not applicable   PCA / epidural No   Capnography     Telemetry ECG Rhythm: Sinus rhythm   Inpatient Telemetry Monitor Ordered? No        Labs Glucose Lab Results   Component Value Date    GLC 99 09/11/2019       Hgb No results found for: HGB    INR No results found for: INR   PACU Imaging Not applicable     Wound/Incision Incision/Surgical Site 09/11/19 Right Shoulder (Active)   Incision Assessment UTV 9/11/2019 12:32 PM   Closure DAVID 9/11/2019 12:32 PM   Incision Drainage Amount None 9/11/2019 12:32 PM   Dressing Intervention Clean, dry, intact 9/11/2019 12:32 PM   Number of days: 0      CMS        Equipment ice pack and shoulder sling   Other LDA       IV Access Peripheral IV 09/11/19 Left Hand (Active)   Site Assessment WDL 9/11/2019 12:32 PM   Line Status Infusing 9/11/2019 12:32 PM   Phlebitis Scale  0-->no symptoms 9/11/2019 12:32 PM   Infiltration Scale 0 9/11/2019  7:20 AM   Dressing Intervention New dressing  9/11/2019  7:20 AM   Number of days: 0       Peripheral IV 09/11/19 Left Wrist (Active)   Site Assessment WDL 9/11/2019 12:32 PM   Line Status Saline locked 9/11/2019 12:32 PM   Phlebitis Scale 0-->no symptoms 9/11/2019 12:32 PM   Number of days: 0      Blood Products Albumin    mL   Intake/Output Date 09/11/19 0700 - 09/12/19 0659   Shift 2342-7300 8333-9198 5283-4913 24 Hour Total   INTAKE   I.V. 800   800   Colloid 500   500   Shift Total(mL/kg) 1300(17.24)   1300(17.24)   OUTPUT   Blood 270   270   Shift Total(mL/kg) 270(3.58)   270(3.58)   Weight (kg) 75.4 75.4 75.4 75.4      Drains / [place] Closed/Suction Drain Right Shoulder Accordion 10 Ghanaian (Active)   Site Description UTV 9/11/2019 12:32 PM   Dressing Status Normal: Clean, Dry & Intact 9/11/2019 12:32 PM   Drainage Appearance Bloody/Bright Red 9/11/2019 12:32 PM   Number of days: 0      Time of void PreOp Void Prior to Procedure: 0900 (09/11/19 0908)    PostOp      Diapered? No   Bladder Scan     PO    tolerating sips     Vitals    B/P: 105/51  T: 98.4  F (36.9  C)    Temp src: Oral  P:  Pulse: 60 (09/11/19 1245)    Heart Rate: 60 (09/11/19 1245)     R: 15  O2:  SpO2: 98 %    O2 Device: Simple face mask (09/11/19 1232)    Oxygen Delivery: 4 LPM (09/11/19 1232)         Family/support present daughter      Patient belongings     Patient transported on cart and air mat   DC meds/scripts (obs/outpt) Not applicable   Inpatient Pain Meds Released? Yes       Special needs/considerations RUE numb due to inteerscalene block   Tasks needing completion None       Lenny De Paz, RN  [facility] [id number]   Home

## 2019-09-11 NOTE — ANESTHESIA POSTPROCEDURE EVALUATION
Anesthesia POST Procedure Evaluation    Patient: Jocelin Moreno   MRN:     [id number] Gender:   female   Age:    79 year old :      1940        Preoperative Diagnosis: Glenohumeral Osteoarthritis   Procedure(s):  Reverse Total Shoulder Arthroplasty   Postop Comments: No value filed.       Anesthesia Type:  Not documented  General, Peripheral Nerve Block    Reportable Event: NO     PAIN: Uncomplicated   Sign Out status: Comfortable, Well controlled pain     PONV: No PONV   Sign Out status:  No Nausea or Vomiting     Neuro/Psych: Uneventful perioperative course   Sign Out Status: Preoperative baseline; Age appropriate mentation     Airway/Resp.: Uneventful perioperative course   Sign Out Status: Non labored breathing, age appropriate RR; Resp. Status within EXPECTED Parameters     CV: Uneventful perioperative course   Sign Out status: Appropriate BP and perfusion indices; Appropriate HR/Rhythm     Disposition:   Sign Out in:  PACU  Disposition:  Floor  Recovery Course: Uneventful  Follow-Up: Not required           Last Anesthesia Record Vitals:  CRNA VITALS  2019 1159 - 2019 1259      2019             EKG:  Sinus rhythm          Last PACU Vitals:  Vitals Value Taken Time   /51 2019  1:30 PM   Temp 36.8  C (98.2  F) 2019  1:45 PM   Pulse 54 2019  1:30 PM   Resp 24 2019  1:45 PM   SpO2 93 % 2019  1:51 PM   Temp src     NIBP     Pulse     SpO2     Resp     Temp     Ht Rate     Temp 2     Vitals shown include unvalidated device data.      Electronically Signed By: Kassi Daley MD, 2019, 2:29 PM

## 2019-09-11 NOTE — ANESTHESIA PROCEDURE NOTES
Peripheral Nerve Block Procedure Note    Staff:     Anesthesiologist:  Graeme Myers MD    Resident/CRNA:  Anshu Chan MD    Block performed by resident/CRNA in the presence of a teaching physician    Location: Pre-op  Procedure Start/Stop TImes:     patient identified, IV checked, site marked, risks and benefits discussed, informed consent, monitors and equipment checked, pre-op evaluation, at physician/surgeon's request and post-op pain management      Correct Patient: Yes      Correct Position: Yes      Correct Site: Yes      Correct Procedure: Yes      Correct Laterality:  Yes    Site Marked:  Yes  Procedure details:     Procedure:  Interscalene    Laterality:  Right    Position:  Supine    Sterile Prep: chloraprep, mask and sterile gloves      Needle:  Short bevel    Needle gauge:  21    Needle length (mm):  100    Ultrasound: Yes      Ultrasound used to identify targeted nerve, plexus, or vascular structure and placed a needle adjacent to it      Permanent Image entered into patiient's record      Abnormal pain on injection: No      Blood Aspirated: No      Paresthesias:  No    Bleeding at site: No      Bolus via:  Needle    Infusion Method:  Single Shot    Blood aspirated via catheter: No      Complications:  None  Assessment/Narrative:     Injection made incrementally with aspirations every (mL):  5

## 2019-09-12 ENCOUNTER — DOCUMENTATION ONLY (OUTPATIENT)
Dept: OTHER | Facility: CLINIC | Age: 79
End: 2019-09-12

## 2019-09-12 ENCOUNTER — APPOINTMENT (OUTPATIENT)
Dept: OCCUPATIONAL THERAPY | Facility: CLINIC | Age: 79
DRG: 483 | End: 2019-09-12
Attending: ORTHOPAEDIC SURGERY
Payer: COMMERCIAL

## 2019-09-12 LAB
GLUCOSE BLDC GLUCOMTR-MCNC: 107 MG/DL (ref 70–99)
HGB BLD-MCNC: 10.6 G/DL (ref 11.7–15.7)

## 2019-09-12 PROCEDURE — 25000128 H RX IP 250 OP 636: Performed by: ORTHOPAEDIC SURGERY

## 2019-09-12 PROCEDURE — 97110 THERAPEUTIC EXERCISES: CPT | Mod: GO

## 2019-09-12 PROCEDURE — 00000146 ZZHCL STATISTIC GLUCOSE BY METER IP

## 2019-09-12 PROCEDURE — 99232 SBSQ HOSP IP/OBS MODERATE 35: CPT | Performed by: INTERNAL MEDICINE

## 2019-09-12 PROCEDURE — 25000128 H RX IP 250 OP 636: Performed by: NURSE PRACTITIONER

## 2019-09-12 PROCEDURE — 97535 SELF CARE MNGMENT TRAINING: CPT | Mod: GO

## 2019-09-12 PROCEDURE — 25000132 ZZH RX MED GY IP 250 OP 250 PS 637: Performed by: INTERNAL MEDICINE

## 2019-09-12 PROCEDURE — 12000001 ZZH R&B MED SURG/OB UMMC

## 2019-09-12 PROCEDURE — 85018 HEMOGLOBIN: CPT | Performed by: ORTHOPAEDIC SURGERY

## 2019-09-12 PROCEDURE — 36415 COLL VENOUS BLD VENIPUNCTURE: CPT | Performed by: ORTHOPAEDIC SURGERY

## 2019-09-12 PROCEDURE — 97165 OT EVAL LOW COMPLEX 30 MIN: CPT | Mod: GO

## 2019-09-12 PROCEDURE — 25000132 ZZH RX MED GY IP 250 OP 250 PS 637: Performed by: ORTHOPAEDIC SURGERY

## 2019-09-12 PROCEDURE — 97530 THERAPEUTIC ACTIVITIES: CPT | Mod: GO

## 2019-09-12 PROCEDURE — 25000132 ZZH RX MED GY IP 250 OP 250 PS 637: Performed by: NURSE PRACTITIONER

## 2019-09-12 RX ORDER — KETOROLAC TROMETHAMINE 15 MG/ML
15 INJECTION, SOLUTION INTRAMUSCULAR; INTRAVENOUS EVERY 6 HOURS
Status: COMPLETED | OUTPATIENT
Start: 2019-09-12 | End: 2019-09-12

## 2019-09-12 RX ADMIN — OXYCODONE HYDROCHLORIDE 5 MG: 5 TABLET ORAL at 02:37

## 2019-09-12 RX ADMIN — ACETAMINOPHEN 975 MG: 325 TABLET, FILM COATED ORAL at 02:36

## 2019-09-12 RX ADMIN — SENNOSIDES AND DOCUSATE SODIUM 2 TABLET: 8.6; 5 TABLET ORAL at 09:01

## 2019-09-12 RX ADMIN — Medication 5 MG: at 16:43

## 2019-09-12 RX ADMIN — ACETAMINOPHEN 975 MG: 325 TABLET, FILM COATED ORAL at 09:00

## 2019-09-12 RX ADMIN — KETOROLAC TROMETHAMINE 15 MG: 15 INJECTION, SOLUTION INTRAMUSCULAR; INTRAVENOUS at 20:29

## 2019-09-12 RX ADMIN — SENNOSIDES AND DOCUSATE SODIUM 2 TABLET: 8.6; 5 TABLET ORAL at 20:29

## 2019-09-12 RX ADMIN — Medication 5 MG: at 21:34

## 2019-09-12 RX ADMIN — CEFAZOLIN SODIUM 1 G: 1 INJECTION, POWDER, FOR SOLUTION INTRAMUSCULAR; INTRAVENOUS at 04:01

## 2019-09-12 RX ADMIN — ATORVASTATIN CALCIUM 20 MG: 20 TABLET, FILM COATED ORAL at 21:34

## 2019-09-12 RX ADMIN — OXYCODONE HYDROCHLORIDE 5 MG: 5 TABLET ORAL at 06:21

## 2019-09-12 RX ADMIN — KETOROLAC TROMETHAMINE 15 MG: 15 INJECTION, SOLUTION INTRAMUSCULAR; INTRAVENOUS at 15:41

## 2019-09-12 RX ADMIN — ACETAMINOPHEN 975 MG: 325 TABLET, FILM COATED ORAL at 15:41

## 2019-09-12 RX ADMIN — Medication 5 MG: at 12:33

## 2019-09-12 RX ADMIN — BENZOCAINE, MENTHOL 1 LOZENGE: 15; 3.6 LOZENGE ORAL at 12:41

## 2019-09-12 ASSESSMENT — ACTIVITIES OF DAILY LIVING (ADL)
ADLS_ACUITY_SCORE: 14

## 2019-09-12 NOTE — PROGRESS NOTES
"Orthopaedic Surgery Progress Note     Dx: R shoulder end stage glenohumeral arthritis   Tx: R shoulder reverse total shoulder arthroplasty    E: No acute events overnight.    S: Pain well controlled, using oxycodone overnight \"to stay on top of my pain.\"     O:   /54 (BP Location: Left arm)   Pulse 69   Temp 98.2  F (36.8  C) (Oral)   Resp 14   Ht 1.626 m (5' 4\")   Wt 75.4 kg (166 lb 3.6 oz)   SpO2 92%   BMI 28.53 kg/m      Drain: 40/90/30    Exam:  Gen: NAD, A/O x 3  Resp: Comfortable, non-labored breathing  RUE:  -Wound dressed, c/d/i  -Sens: SILT m/r/u/ax  -Motor: 4/5 , io, epl, fpl  -Vasc: 2+ pulses, wwp, brisk cap refill      Recent Labs   Lab 09/11/19  0720   GLC 99     Recent Labs   Lab 09/12/19  0537   HGB 10.6*         Impression: 79 year old female s/p on reverse total shoulder doing well.     Plan:  - Activity: As tolerated  - Antibiotics: complete   - Diet: resume usual pre op diet.  - DVT prophylaxis: mechanical only  - Wound Care: Aquacel x 5 days.  - Drain: will discharge when less than 30 ml<shift  - Pain management: PNB/Oral pain medications, judicious use   - Physical Therapy:   - Occupational Therapy: No active or passive range of motion. Elbow and wrist motion okay   - Dispo: Home pending pain, drain,   - Follow up:  Two weeks with Dr Scales  Future Appointments   Date Time Provider Department [place]   9/12/2019  9:30 AM HeldL[place]na OT [facility] [place]   9/12/2019  2:15 PM HeldLorena OT [facility] [place]   9/12/2019  7:00 PM UR PT OVERFLOW [facility] [place]     discussed with Dr Loyda Rosario, APRN, CNP  Department of Orthopedic Surgery  [facility]  [phone]    For any questions regarding this patient please page me at the above number prior to contacting the ortho resident on call.        "

## 2019-09-12 NOTE — PHARMACY-ADMISSION MEDICATION HISTORY
"Admission medication history interview status for the 9/11/2019 admission is complete. See Epic admission navigator for allergy information, pharmacy, prior to admission medications and immunization status.     Medication history interview sources:  patient, Care Everywhere, electronic fill history    Changes made to PTA medication list (reason)  Added: Zaditor PRN (per pt)  Deleted: diphenhydramine prn (per pt), docusate (per pt), doxycycline (per pt, old rx from May/therapy completed), ibuprofen/diphenhydramine combo (per pt), olopatadine (per pt now on Zaditor)  Changed: added frequency information for the aspirin, atorvastatin, diltiazem, estrace, ibuprofen, ketoconazole; changed the following meds to prn: betamethasone, clobetasol, clotrimazole; Tylenol from PRN to 2 tablets BID    Additional medication history information (including reliability of information, actions taken by pharmacist):  -The atorvastatin, diltiazem, and the ketoconazole shampoo were all filled recently in the past 3 months.  -Patient seemed like a reliable historian.  -Patient is using the Zaditor OTC as it is \"less harsh\" than the Pataday she was prescribed. She calls the Zaditor \"Zataday\" though, but I am pretty sure she is referencing Zaditor.  -Patient uses all of the creams and solutions on the list PRN.    Prior to Admission medications    Medication Sig Last Dose Taking? Auth Provider   acetaminophen (TYLENOL) 500 MG tablet Take 500 mg by mouth 2 times daily  Past Week at Unknown time Yes Reported, Patient   aspirin 81 MG EC tablet Take 81 mg by mouth daily  Past Week at Unknown time Yes Reported, Patient   atorvastatin (LIPITOR) 20 MG tablet Take 20 mg by mouth At Bedtime  9/10/2019 at 2200 Yes Reported, Patient   betamethasone dipropionate (DIPROSONE) 0.05 % external cream Apply topically 2 times daily as needed (itchy and rashy areas of back) Past Week at Unknown time Yes Unknown, Entered By History   clobetasol (TEMOVATE) 0.05 % " external solution Apply topically daily as needed (Seborrheic dermatitis) Past Week at Unknown time Yes Unknown, Entered By History   clotrimazole (LOTRIMIN) 1 % external cream Apply topically 2 times daily as needed Past Week at Unknown time Yes Unknown, Entered By History   diltiazem ER (DILT-XR) 180 MG 24 hr capsule Take 180 mg by mouth At Bedtime  9/10/2019 at 2200 Yes Reported, Patient   estradiol (ESTRACE) 0.1 MG/GM vaginal cream Place 2 g vaginally daily as needed (after showers)  Past Month at Unknown time Yes Reported, Patient   ibuprofen (ADVIL/MOTRIN) 200 MG tablet Take 400 mg by mouth 2 times daily  Past Week at Unknown time Yes Reported, Patient   ketoconazole (NIZORAL) 2 % external shampoo Apply topically daily as needed (Seborrheic dermatitis)  Past Week at Unknown time Yes Reported, Patient   ketotifen (ZADITOR/REFRESH ANTI-ITCH) 0.025 % ophthalmic solution Place 1 drop into both eyes daily as needed for itching Past week Yes Unknown, Entered By History     Medication history completed by: Cynthia Mckeon, PharmD, BCPS

## 2019-09-12 NOTE — PLAN OF CARE
Discharge Planner PT   Patient plan for discharge: unknown  Current status: PT orders received for PT evaluation and treatment: s/p TSA.  Per OT pt does not have any skilled PT needs, all needs can be met by one service.  Pt was able to mobilize safely with HHA.    Barriers to return to prior living situation: See OT note for detail.   Recommendations for discharge: See OT not for details.   Rationale for recommendations: Pt does not have any skilled inpatient PT needs, PT order completed.         Entered by: Hien Brown 09/12/2019 11:45 AM

## 2019-09-12 NOTE — PROGRESS NOTES
"SPIRITUAL HEALTH SERVICES  [facility] ([facility]) 8A  ON-CALL VISIT     REFERRAL SOURCE: I did visit this morning patient Jocelin Bragg per [facility]  referral. Pt and pt's daughter was in the room. Pt looks tired and sleepy. I introduced myself as the on-call  and talked with pt daughter.      Pt daughter said, \"she is so tired and I dark the room to let her sleep. Keep her in your prayer.\"     PLAN: The unit  will follow up the pt as needed.     Jeff Aburto M.Div. ([place]), M.Th., D.Min., [facility]  Staff   Pager [phone]    "

## 2019-09-12 NOTE — PROGRESS NOTES
09/12/19 1000   Quick Adds   Type of Visit Initial Occupational Therapy Evaluation   Living Environment   Lives With alone   Living Arrangements apartment  (independent senior living )   Home Accessibility wheelchair accessible  (independent living center )   Transportation Anticipated family or friend will provide   Living Environment Comment handicap accessible apartment. patient has family available to assist   Self-Care   Usual Activity Tolerance excellent   Current Activity Tolerance good   Regular Exercise Yes   Activity/Exercise Type walking   Exercise Amount/Frequency daily   Equipment Currently Used at Home grab bar, toilet;grab bar, tub/shower;shower chair   Activity/Exercise/Self-Care Comment anticipates help from home health care with self cares   Functional Level   Ambulation 0-->independent   Transferring 0-->independent   Toileting 0-->independent   Bathing 0-->independent   Dressing 0-->independent   Eating 0-->independent   Communication 0-->understands/communicates without difficulty   Swallowing 0-->swallows foods/liquids without difficulty   Cognition 0 - no cognition issues reported   Fall history within last six months no   Prior Functional Level Comment PTA patient was independent with mobility and ADLs. Pt reports she is very active at baseline.    General Information   Onset of Illness/Injury or Date of Surgery - Date 09/11/19   Referring Physician Lonnie Scales MD.    Patient/Family Goals Statement to heal and be independent while learning to be as independent as possible   Additional Occupational Profile Info/Pertinent History of Current Problem s/p R reverse TSA   Precautions/Limitations other (see comments)  (no PROM/AROM of shoulder )   Weight-Bearing Status - RUE nonweight-bearing   General Observations hemovac drain, daughter present for session   Cognitive Status Examination   Orientation orientation to person, place and time   Level of Consciousness alert   Follows Commands  (Cognition) WNL   Memory intact   Attention No deficits were identified   Organization/Problem Solving No deficits were identified   Executive Function No deficits were identified   Visual Perception   Visual Perception Wears glasses   Sensory Examination   Sensory Comments N/T from block   Pain Assessment   Patient Currently in Pain Yes, see Vital Sign flowsheet   Range of Motion (ROM)   ROM Comment RUE not tested secondary to precautions. LUE WFL   Strength   Strength Comments Not formally tested due to precautions. Strength appears to be overall WFL    Muscle Tone Assessment   Muscle Tone Quick Adds No deficits were identified   Coordination   Upper Extremity Coordination Right UE impaired   Functional Limitations Impaired ability to perform bilateral tasks   Mobility   Bed Mobility Comments Supine>sit independent   Transfer Skill: Bed to Chair/Chair to Bed   Level of [place]: Bed to Chair stand-by assist   Physical Assist/Nonphysical Assist: Bed to Chair set-up required   Weight-Bearing Restrictions nonweight-bearing  (R UE  )   Transfer Skill: Sit to Stand   Level of [place]: Sit/Stand stand-by assist   Physical Assist/Nonphysical Assist: Sit/Stand supervision   Transfer Skill: Sit to Stand nonweight-bearing  (R UE )   Toilet Transfer   Toilet Transfer Comments did not assess during eval   Balance   Balance Comments Requested hand held assist while ambulating   Upper Body Dressing   Level of [place]: Dress Upper Body minimum assist (75% patients effort)   Physical Assist/Nonphysical Assist: Dress Upper Body set-up required;verbal cues   Lower Body Dressing   Level of [place]: Dress Lower Body stand-by assist   Physical Assist/Nonphysical Assist: Dress Lower Body set-up required;verbal cues   Toileting   Level of [place]: Toilet independent   Grooming   Level of [place]: Grooming stand-by assist   Physical Assist/Nonphysical Assist: Grooming set-up required;verbal cues   Eating/Self  "Feeding   Level of [place]: Eating independent   Activities of Daily Living Analysis   Impairments Contributing to Impaired Activities of Daily Living pain;post surgical precautions;ROM decreased   General Therapy Interventions   Planned Therapy Interventions ADL retraining   Clinical Impression   Criteria for Skilled Therapeutic Interventions Met yes, treatment indicated   OT Diagnosis decreased independence with ADLs   Influenced by the following impairments pain, post-op precautions   Assessment of Occupational Performance 3-5 Performance Deficits   Identified Performance Deficits dressing, bathing, home management, toileting   Clinical Decision Making (Complexity) Low complexity   Therapy Frequency 2x/day  (decreasing to daily as appropriate )   Predicted Duration of Therapy Intervention (days/wks) 2 days   Anticipated Discharge Disposition Home with Home Therapy   Risks and Benefits of Treatment have been explained. Yes   Patient, Family & other staff in agreement with plan of care Yes   [facility] TM \"6 Clicks\"   2016, Trustees of [facility], under license to [employer].  All rights reserved.   6 Clicks Short Forms Daily Activity Inpatient Short Form   [facility]  \"6 Clicks\" Daily Activity Inpatient Short Form   1. Putting on and taking off regular lower body clothing? 4 - None   2. Bathing (including washing, rinsing, drying)? 2 - A Lot   3. Toileting, which includes using toilet, bedpan or urinal? 4 - None   4. Putting on and taking off regular upper body clothing? 3 - A Little   5. Taking care of personal grooming such as brushing teeth? 4 - None   6. Eating meals? 4 - None   Daily Activity Raw Score (Score out of 24.Lower scores equate to lower levels of function) 21   Total Evaluation Time   Total Evaluation Time (Minutes) 8     "

## 2019-09-12 NOTE — PLAN OF CARE
Discharge Planner OT   Patient plan for discharge: home tomorrow  Current status: Educated on role of Ot, plan of care, and post-op precautions. Pt IND with doffing sling, min A to don. Pt ambulated in hallway with HHA in Am, in PM ambulated with SBA, occasional sway but no significant loss of balance. Completed toilet task independently.     Encouraged pt to ambulate in hallway again tonight with staff or family member.   Barriers to return to prior living situation: post-op precautions  Recommendations for discharge: home with assist, home care  Rationale for recommendations: Pt will have family members assist initially and would like home health aid to assist with bathing. Home OT would be beneficial to increase independence with one handed ADLs.       Entered by: Shade Burks 09/12/2019 3:05 PM

## 2019-09-12 NOTE — PLAN OF CARE
"Vitals: /54 (BP Location: Left arm)   Pulse 69   Temp 98.2  F (36.8  C) (Oral)   Resp 14   Ht 1.626 m (5' 4\")   Wt 75.4 kg (166 lb 3.6 oz)   SpO2 92%   BMI 28.53 kg/m    On room air. Lung sounds clear. Denies CP, SOB.   Output: Voiding without difficulty.   LBM 9/10   Activity: Up with SBA   Skin: Incision, R shoulder hemovac, otherwise intact. Bruises L FA and posterior knee   Pain: Taking oxycodone, tylenol.   CMS Pt reports that as numbness if RUE (due to block) is beginning to subside, minimal tingling.    Dressing: R shoulder aquacel CDI,Sling.   Diet: Regular. Tolerating well.   LDA: L PIV x2, R shoulder hemovac.   Equipment: Capno, sling, PCDs   Plan  Home when medically stable.    Additional Pain management, and drain removal before discharge.     "

## 2019-09-12 NOTE — OP NOTE
"Procedure Date: 09/11/2019      PREOPERATIVE DIAGNOSIS:  Right shoulder endstage glenohumeral arthritis.      POSTOPERATIVE DIAGNOSIS:  Right shoulder rotator cuff tear arthropathy.      SURGICAL PROCEDURE:  Right shoulder reverse total shoulder arthroplasty.      SURGEON:  Lonnie Scales MD.      ESTIMATED BLOOD LOSS:  250 mL.      IMPLANTS:   1.  Biomet comprehensive size 25 mm mini baseplate and appropriate screws.   2.  Biomet comprehensive size 40 mm standard glenosphere to the C offset in the 6 o'clock anatomic position.   3.  Biomet comprehensive size 13 x 55 mm press-fit humeral stem.   4.  Biomet comprehensive size 40 mm +5 standard offset humeral tray.   5.  Biomet comprehensive size 40 mm standard offset glenosphere.      INDICATIONS:  Ms. Moreno is a very pleasant 79-year-old woman with history of pain and disability in her shoulder.  Radiographic evaluation was consistent with endstage glenohumeral arthritis.  After discussion of risks and benefits of surgical versus nonsurgical management, she elected to proceed with surgical remediation.  She understood that we would make a decision about reverse versus anatomic total shoulder arthroplasty based on the condition of her rotator cuff  intraoperatively.      DESCRIPTION OF THE PROCEDURE:  The patient was positively identified in the pre-anesthesia care area, surgical site was initialed by me and her consent was reviewed with her.  She was then taken to the operating theater.  She was placed in a well-padded beachchair position and prepped and draped in the usual sterile fashion for right upper extremity surgery.      Prior to surgical initiation, a \"time out\" was held in accordance with hospital policy.  Verbal verification of delivery of prophylactic intravenous antibiotics was performed.      After establishment of a 12-cm anterior deltopectoral incision, I was able to identify the cephalic vein and allowed it to track medially.  Tributaries to the " deltoid were ligated and coagulated.  I mobilized in the subdeltoid space.  She had no meaningful subscapularis.  There was significant bursitis in this region and full thickness discontinuity of the supraspinatus.      I was able to clip the anterior humeral circumflex artery and then externally rotate and deliver the humerus into the wound.  The biceps was tenodesed to the pectoralis major tendon and the residual amputated.      I was then able to come into the subdeltoid space and mobilized this and exposed it.  I reamed until a size 13 mm reamer had acceptable cortical chatter, then I osteotomized the head in 30 degrees of retroversion and broached.  I removed the circumferential osteophyte.      I turned my attention to the glenoid.      I did a circumferential release of the subscapularis.  There was not much left of it.  I did a removal of the labrum and there was no residual bone in the glenoid.  I identified the inferior pole, placed the guide pin and reamed.  I placed the center screw followed by the peripheral screws.  I placed the glenosphere in position.      I turned my attention back to the humeral side.      I placed two #2 FiberWires through the cut neck of the humerus and then placed the definitive implant into position.  I reduced this and found 1 mm longitudinal traction and no dislocation with anterior superiorly-directed force in maximum adduction and internal rotation.  Consequently, I reapproximated the subscapularis back using simple stitches to the cut neck of the humerus and fixed the remnant of the subscap.  A drain was placed proximally to the axillary nerve and the axillary nerve verified to be in continuity using the tug test.      was initiated with Ethibonds proximally and distally to ahsan the interval in case of need for later revision followed by 0 Vicryl, 2-0 Vicryl and 3-0 running subcuticular Monocryl.  Steri-Strips and a sterile nonadherent dressing were applied.  A sling was  placed.      POSTOPERATIVE PLAN:   1.  The patient will be admitted to the Orthopedic Service for intravenous followed by oral pain medications.  She should have no active or passive range of motion at this time.   2.  At the 2-week ahsan, she will continue without any active or passive range of motion.  That visit to my clinic is optional.   3.  At the 6-week ahsan, her sling will be discontinued and she will begin activities of daily living.   4.  At 3 months, she will have radiographs and be evaluated clinically to see whether or not she needs the [facility] reverse total shoulder arthroplasty protocol.         RADHA LIM MD             D: 2019   T: 2019   MT:       Name:     ALYSSA CONTRERAS   MRN:      [id number]        Account:        [id number]   :      1940           Procedure Date: 2019      Document: [id number]

## 2019-09-12 NOTE — PROGRESS NOTES
[facility]    Medicine Progress Note - Hospitalist Service       Date of Admission:  9/11/2019  Assessment & Plan     Jocelin Moreno is a 79 year old female admitted on 9/11/2019 s/p:      Pre-operative diagnosis:         Glenohumeral Osteoarthritis  Reverse Total Shoulder Arthroplasty  Surgeon:         Surgeon(s) and Role:     * Lonnie Scales MD - Primary  Estimated blood loss:  270 mL  Complications:            None.      Doing well.      Medical issues:     #History of hypertension:  #Diastolic dysfunction: Stable  #History of known atrial flutter, resolved spontaneously.  Not on anticoagulation.  #Normal stress test 2015.    Currently asymptomatic.  Hemodynamics stable.  -Continue home diltiazem, reduced dose 120 mg at bedtime. w holding parameters.  -Continue statin  -Resume aspirin 81, when safe from bleeding standpoint.  -Monitor vitals.     #History of COPD: Stable.  Continue home inhalers.  -Encourage incentive spirometry.     #No other significant medical history.        # Orthopedic Surgery:      Post Op Management per Primary team.      Anemia of acute blood loss: Monitor hgb for anemia of acute blood loss. Transfuse for hgb <7.0     Pain control- per Primary team and/or Pain team.    Minimize use of narcotics as able. Consider bowel regimen with narcotics.      DVT prophylaxis- per Primary team  Activity, antibiotics, wound and/or drain care - per Primary team.        Rest per primary team.     The patient's care was discussed with the Patient, Patient's Family and RN.    Popeye Castro MD  Hospitalist Service  [facility], [place]    ______________________________________________________________________    Interval History     States doing well  Denies fever or chills.   No cough or cp or sob.   No LH or dizziness.   No NV   No new sensory or motor complaint.   No new rash.    No other new or acute medical concern      Data  "reviewed today: I reviewed all medications, new labs and imaging results over the last 24 hours. I personally reviewed no images or EKG's today.    Physical Exam   Vital Signs: Temp: 98.2  F (36.8  C) Temp src: Oral BP: 110/54 Pulse: 69 Heart Rate: 64 Resp: 14 SpO2: 92 % O2 Device: None (Room air) Oxygen Delivery: 1 LPM  Weight: 166 lbs 3.63 oz    General: alert, interactive, NAD  HEENT: AT/NC, Anicteric, Moist MM  Respi/Chest: Non labored, CTA BL.  CVS/Heart: S1S2 regular  GI/Abd: Soft, non tender, non distended,   MSK/Extremities: Distally warm, well perfused.   R arm sling.   Neuro: AO x 4,   Psychiatry: Stable mood.         Data   Recent Labs   Lab 09/12/19  0537 09/11/19  0720   HGB 10.6*  --    GLC  --  99     No results found for this or any previous visit (from the past 24 hour(s)).  Medications     bupivacaine liposome (EXPAREL) LONG ACTING injection was administered into the infiltration site to produce postsurgical analgesia. Duration of action is up to 72 hours, and other \"cosmo\" medications should not be given for 96 hours with the exception of the lidocaine 5% patch (LIDODERM) and the lidocaine 10mg in potassium infusions. This entry is for INFORMATION ONLY.       sodium chloride 100 mL/hr at 09/11/19 1530       acetaminophen  975 mg Oral Q8H     atorvastatin  20 mg Oral At Bedtime     diltiazem ER  120 mg Oral At Bedtime     menthol   Transdermal Q8H     senna-docusate  1 tablet Oral BID    Or     senna-docusate  2 tablet Oral BID     sodium chloride (PF)  3 mL Intracatheter Q8H     "

## 2019-09-12 NOTE — PLAN OF CARE
"Vitals: /50 (BP Location: Left arm)   Pulse 69   Temp 96.9  F (36.1  C) (Oral)   Resp 13   Ht 1.626 m (5' 4\")   Wt 75.4 kg (166 lb 3.6 oz)   SpO2 94%   BMI 28.53 kg/m    On room air. Lung sounds clear. Denies CP, SOB.   Output: Voiding without difficulty  Last BM: 9/10   Activity: Up with assist of 1, SBA   Skin: Incision, R shoulder hemovac, otherwise intact. Bruises L FA and posterior knee   Pain: Negligible pain. Taking oxycodone to bridge gap as blocks begins to wear off, given once overnight. Scheduled tylenol.   CMS/Neuro: A&O x 4. Pt reports that as numbness if RUE (due to block) is beginning to subside, there is faint tingling. Minimal movement in right fingers, improving.   Dressing: R shoulder aquacel CDI.   Diet: Regular. No nausea.   LDA: L PIV x2, R shoulder hemovac.   Equipment: Capno, sling, PCDs   Plan/Add'l info: Able to make needs known. Call light within reach. Continue POC.  Patient would like to speak to ortho team about time frame in which to expect the block to wear off.       "

## 2019-09-12 NOTE — PROGRESS NOTES
Care Coordinator - Discharge Planning    Admission Date/Time:  9/11/2019  Attending MD:  Lonnie Scales*     Data  Date of initial CC assessment: 9/12/19  Chart reviewed, discussed with interdisciplinary team.   Patient was admitted for: No diagnosis found.     Assessment   Concerns with insurance coverage for discharge needs: None.  Current Living Situation: Patient lives alone.  Support System: Supportive and Involved-children  Services Involved: none PTA  Transportation at Discharge: Car and Family or friend will provide  Transportation to Medical Appointments:    - self or children  Barriers to Discharge: none    Coordination of Care and Referrals: Provided patient/family with options for Home Care.  Per MD team, patient will be ready for discharge tomorrow. Per chart review, it appears that OP [facility] clinic has set her up with home care ([facility]  Phone: [phone] /Fax: [phone]). Met with patient to discuss. She confirms this, stating she is all pre approved with [employer]. I placed call to [facility], they do have patient set up but will need orders and discontinue summary faxed to them at the time of discharge.Orders have been placed.      Plan  Anticipated Discharge Date: Tomorrow  Anticipated Discharge Plan:  Home with home care    Yuri Norris, PADMINI Care Coordinator [phone]

## 2019-09-12 NOTE — PLAN OF CARE
VS: VSS   O2: RA   Output: Voiding without difficulty   Last BM: 9/10   Activity: Standby assist   Skin: Incision, drain R shoulder. Bruises L FA and posterior knee.   Pain: 5mg oxycodone x1   CMS: Numbness RUE from block   Dressing: Aquacel CDI   Diet: Regular   Plan: TBD   Additional Info:

## 2019-09-13 ENCOUNTER — APPOINTMENT (OUTPATIENT)
Dept: OCCUPATIONAL THERAPY | Facility: CLINIC | Age: 79
DRG: 483 | End: 2019-09-13
Attending: ORTHOPAEDIC SURGERY
Payer: COMMERCIAL

## 2019-09-13 VITALS
HEIGHT: 64 IN | HEART RATE: 44 BPM | SYSTOLIC BLOOD PRESSURE: 147 MMHG | OXYGEN SATURATION: 98 % | TEMPERATURE: 97.6 F | RESPIRATION RATE: 16 BRPM | WEIGHT: 166.23 LBS | DIASTOLIC BLOOD PRESSURE: 60 MMHG | BODY MASS INDEX: 28.38 KG/M2

## 2019-09-13 LAB
ANION GAP SERPL CALCULATED.3IONS-SCNC: 7 MMOL/L (ref 3–14)
BUN SERPL-MCNC: 23 MG/DL (ref 7–30)
CALCIUM SERPL-MCNC: 8.4 MG/DL (ref 8.5–10.1)
CHLORIDE SERPL-SCNC: 109 MMOL/L (ref 94–109)
CO2 SERPL-SCNC: 26 MMOL/L (ref 20–32)
CREAT SERPL-MCNC: 0.58 MG/DL (ref 0.52–1.04)
GFR SERPL CREATININE-BSD FRML MDRD: 87 ML/MIN/{1.73_M2}
GLUCOSE SERPL-MCNC: 95 MG/DL (ref 70–99)
HGB BLD-MCNC: 10.8 G/DL (ref 11.7–15.7)
MAGNESIUM SERPL-MCNC: 2 MG/DL (ref 1.6–2.3)
POTASSIUM SERPL-SCNC: 4.1 MMOL/L (ref 3.4–5.3)
SODIUM SERPL-SCNC: 142 MMOL/L (ref 133–144)

## 2019-09-13 PROCEDURE — 97535 SELF CARE MNGMENT TRAINING: CPT | Mod: GO

## 2019-09-13 PROCEDURE — 36415 COLL VENOUS BLD VENIPUNCTURE: CPT | Performed by: ORTHOPAEDIC SURGERY

## 2019-09-13 PROCEDURE — 25000132 ZZH RX MED GY IP 250 OP 250 PS 637: Performed by: ORTHOPAEDIC SURGERY

## 2019-09-13 PROCEDURE — 93010 ELECTROCARDIOGRAM REPORT: CPT | Performed by: INTERNAL MEDICINE

## 2019-09-13 PROCEDURE — 93005 ELECTROCARDIOGRAM TRACING: CPT | Performed by: INTERNAL MEDICINE

## 2019-09-13 PROCEDURE — 83735 ASSAY OF MAGNESIUM: CPT | Performed by: ORTHOPAEDIC SURGERY

## 2019-09-13 PROCEDURE — 97110 THERAPEUTIC EXERCISES: CPT | Mod: GO

## 2019-09-13 PROCEDURE — 25000132 ZZH RX MED GY IP 250 OP 250 PS 637: Performed by: NURSE PRACTITIONER

## 2019-09-13 PROCEDURE — 80048 BASIC METABOLIC PNL TOTAL CA: CPT | Performed by: ORTHOPAEDIC SURGERY

## 2019-09-13 PROCEDURE — 83735 ASSAY OF MAGNESIUM: CPT | Performed by: INTERNAL MEDICINE

## 2019-09-13 PROCEDURE — 99232 SBSQ HOSP IP/OBS MODERATE 35: CPT | Performed by: INTERNAL MEDICINE

## 2019-09-13 PROCEDURE — 85018 HEMOGLOBIN: CPT | Performed by: ORTHOPAEDIC SURGERY

## 2019-09-13 RX ORDER — ACETAMINOPHEN 325 MG/1
TABLET ORAL
Qty: 1 BOTTLE | Refills: 0 | Status: SHIPPED | OUTPATIENT
Start: 2019-09-13

## 2019-09-13 RX ORDER — DILTIAZEM HYDROCHLORIDE 120 MG/1
120 CAPSULE, EXTENDED RELEASE ORAL AT BEDTIME
Qty: 7 CAPSULE | Refills: 0 | Status: SHIPPED | OUTPATIENT
Start: 2019-09-13 | End: 2019-09-20

## 2019-09-13 RX ORDER — AMOXICILLIN 250 MG
1 CAPSULE ORAL 2 TIMES DAILY
Qty: 20 TABLET | Refills: 0 | Status: SHIPPED | OUTPATIENT
Start: 2019-09-13

## 2019-09-13 RX ORDER — OXYCODONE HYDROCHLORIDE 5 MG/1
2.5-5 TABLET ORAL EVERY 4 HOURS PRN
Qty: 35 TABLET | Refills: 0 | Status: SHIPPED | OUTPATIENT
Start: 2019-09-13 | End: 2019-09-13

## 2019-09-13 RX ORDER — OXYCODONE HYDROCHLORIDE 5 MG/1
2.5-5 TABLET ORAL EVERY 4 HOURS PRN
Qty: 35 TABLET | Refills: 0 | Status: SHIPPED | OUTPATIENT
Start: 2019-09-13

## 2019-09-13 RX ADMIN — Medication 2.5 MG: at 11:01

## 2019-09-13 RX ADMIN — Medication 2.5 MG: at 01:50

## 2019-09-13 RX ADMIN — ACETAMINOPHEN 975 MG: 325 TABLET, FILM COATED ORAL at 01:26

## 2019-09-13 RX ADMIN — SENNOSIDES AND DOCUSATE SODIUM 2 TABLET: 8.6; 5 TABLET ORAL at 08:15

## 2019-09-13 RX ADMIN — ACETAMINOPHEN 975 MG: 325 TABLET, FILM COATED ORAL at 08:15

## 2019-09-13 RX ADMIN — Medication 2.5 MG: at 06:11

## 2019-09-13 ASSESSMENT — ACTIVITIES OF DAILY LIVING (ADL)
ADLS_ACUITY_SCORE: 14

## 2019-09-13 NOTE — PROGRESS NOTES
[facility] Progress Note - Hospitalist Service       Date of Admission:  9/11/2019  Assessment & Plan     Jocelin Moreno is a 79 year old female admitted on 9/11/2019 s/p:      Pre-operative diagnosis:         Glenohumeral Osteoarthritis  Reverse Total Shoulder Arthroplasty  Surgeon:         Surgeon(s) and Role:     * Lonnie Scales MD - Primary  Estimated blood loss:  270 mL  Complications:            None.      Doing well.      Medical issues:     #History of hypertension:  #Diastolic dysfunction: Stable  #History of known atrial flutter, resolved spontaneously.  Not on anticoagulation.  #Normal stress test 2015.    Currently asymptomatic.  Hemodynamics stable.  -Continue home diltiazem, reduced dose 120 mg at bedtime. w holding parameters.  -Continue statin  -Resume aspirin 81, when safe from bleeding standpoint.  -Monitor vitals.    9/13/2019:   >> Patient w bradycardia. EKG: HR 81 w 1st degree heart block. PVCs. Mg, K wnl. D.w Cardiology curbside. Pt asymptomatic. bp stable.   Follow-up with pcp  Discharge on decreased dose of diltiazem 120.   PCP may consider holter. Echo.   Pt and family counseled in details.   Agrees with the plan.        #History of COPD: Stable.  Continue home inhalers.  -Encourage incentive spirometry.     #No other significant medical history.        # Orthopedic Surgery:      Post Op Management per Primary team.      Anemia of acute blood loss: Monitor hgb for anemia of acute blood loss. Transfuse for hgb <7.0     Pain control- per Primary team and/or Pain team.    Minimize use of narcotics as able. Consider bowel regimen with narcotics.      DVT prophylaxis- per Primary team  Activity, antibiotics, wound and/or drain care - per Primary team.        Rest per primary team.     The patient's care was discussed with the Patient, Patient's Family and RN.    Popeye Castro MD  Hospitalist Service  [facility]  "[facility]    ______________________________________________________________________    Interval History     States doing well  Denies fever or chills.   No cough or cp or sob.   No LH or dizziness.   No NV   No new sensory or motor complaint.   No new rash.    No other new or acute medical concern      Data reviewed today: I reviewed all medications, new labs and imaging results over the last 24 hours. I personally reviewed no images or EKG's today.    Physical Exam   Vital Signs: Temp: 97.6  F (36.4  C) Temp src: Oral BP: 130/41 Pulse: (!) 41 Heart Rate: 61 Resp: 16 SpO2: 98 % O2 Device: None (Room air)    Weight: 166 lbs 3.63 oz    General: alert, interactive, NAD  HEENT: AT/NC, Anicteric, Moist MM  Respi/Chest: Non labored, CTA BL.  CVS/Heart: S1S2 regular  GI/Abd: Soft, non tender, non distended,   MSK/Extremities: Distally warm, well perfused.   R arm sling.   Neuro: AO x 4,   Psychiatry: Stable mood.         Data   Recent Labs   Lab 09/13/19  0632 09/12/19  0537 09/11/19  0720   HGB 10.8* 10.6*  --      --   --    POTASSIUM 4.1  --   --    CHLORIDE 109  --   --    CO2 26  --   --    BUN 23  --   --    CR 0.58  --   --    ANIONGAP 7  --   --    LAST 8.4*  --   --    GLC 95  --  99     No results found for this or any previous visit (from the past 24 hour(s)).  Medications     bupivacaine liposome (EXPAREL) LONG ACTING injection was administered into the infiltration site to produce postsurgical analgesia. Duration of action is up to 72 hours, and other \"cosmo\" medications should not be given for 96 hours with the exception of the lidocaine 5% patch (LIDODERM) and the lidocaine 10mg in potassium infusions. This entry is for INFORMATION ONLY.       sodium chloride 100 mL/hr at 09/11/19 1530       acetaminophen  975 mg Oral Q8H     atorvastatin  20 mg Oral At Bedtime     diltiazem ER  120 mg Oral At Bedtime     menthol   Transdermal Q8H     senna-docusate  1 tablet Oral BID    Or     senna-docusate  2 " tablet Oral BID     sodium chloride (PF)  3 mL Intracatheter Q8H

## 2019-09-13 NOTE — PLAN OF CARE
9/13 07:00-12:00  VS: Low HR 41 this morning, re-checked @ 09:45 was 44  MD aware.  EKG was ordered and completed prior to discharge.   O2 Sats > 90 on room air  Lungs CTA  IS goal 1850, reaching 2000 without difficulty, hourly use encouraged   Output: Voids spontaneously without difficulty   Bowels/BM BS + all quadrants, + flatus  Last BM 9/10   Activity Stand by assist    Skin: Intact except for surgical incision   Pain: Surgical site pain managed with prn oxycodone, 2.5 mg   Neuro/CMS: Intact    Dressing: Aquacel dressing CDI   Diet: Regular   LDA: PIV left hand and wrist, removed in anticipation of discharge   Equipment: Sling with pillow   Plan: Discharge home   Additional Info: Discharge instructions and medications reviewed with pt. Pt verbalized understanding.  Instructed to monitor HR at home.  Dr Castro spoke to her about her Diltiazem.  Pt taking 120 mg while in the hospital pt to follow up with primary care and decide on dosing based on BP and HR.  Discharged with all personal belongings and discharge medications.

## 2019-09-13 NOTE — PLAN OF CARE
Discharge Planner OT   Patient plan for discharge: home with home health care  Current status: patient able to don/doff sling with SBA and min vc's. Patient IND with LE dressing, min A for UE dressing using one handed techniques. Patient completed elbow, wrist, hand HEP.   Barriers to return to prior living situation: none anticipated  Recommendations for discharge: home with assist from family and home OT/RN/home health aide  Rationale for recommendations: patient would benefit from home OT and home health aide to increase IND with one handed ADLs, and assist with bathing tasks initially.        Entered by: CHARITY GUTIERRES HELD 09/13/2019 2:53 PM       Occupational Therapy Discharge Summary    Reason for therapy discharge:    Discharged to home with home therapy.    Progress towards therapy goal(s). See goals on Care Plan in [facility] electronic health record for goal details.  Goals met    Therapy recommendation(s):    Home with assist as needed and home OT/RN/home health aide; continue with elbow, wrist, hand HEP

## 2019-09-13 NOTE — PLAN OF CARE
VS: Stable.   O2: RA.   Output: Voiding adequately in bathroom.   Last BM: 9/10/19.   Activity: SBA.   Skin: Incision, drain.   Pain: Discomfort right shoulder managed with oxycodone and scheduled tylenol.   CMS: Intact.   Dressing: CDI.   Diet: Regular.   LDA: PIV left hand SL. PIV left wrist SL. Hemovac with bloody/red output, bag marked.   Equipment: Immobilizer, IS, PCD, pillows, call light within reach.   Plan: Continue to monitor. Anticipate discharge to home.   Additional Info:

## 2019-09-13 NOTE — PROGRESS NOTES
S:   Pt doing well.  Pain well controlled.    O:   Sets deltoid.         EPL, FPL, FA, I, G + motor.         Sensation grossly intact to light touch in Axillary,            Musculocutaneous, Median, Radial, and            Ulnar nerve distributions.         Dressing clean, dry and intact.       A:   Doing well         P:   ** D/C planning when discharge criteria met                   Sling adjusted.        ** Appreciate Medicine assistance with medical            comorbidities.        ** PO Analgesia today.        ** Sling at all times when not doing exercises.   ** NSAIDs are OK if needed for pain relief and not contraindicated.

## 2019-09-13 NOTE — DISCHARGE SUMMARY
[facility] Orthopaedic Surgery Discharge Summary    Jocelin Moreno MRN# [id number]   Age: 79 year old YOB: 1940       Date of Admission:  9/11/2019  Date of Discharge::  9/13/2019  Admitting Physician:  Lonnie Scales MD  Discharge To:  Home   Primary Care Physician:      Naveed Flood          Admission Diagnoses:   Glenohumeral Osteoarthritis  Status post shoulder surgery         Discharge Diagnosis:   same           Procedures Performed:   Right shoulder reverse total shoulder arthoplasty         Consultations:   OCCUPATIONAL THERAPY ADULT IP CONSULT  PHYSICAL THERAPY ADULT IP CONSULT  MEDICATION HISTORY IP PHARMACY CONSULT          Brief History:   Ms. Moreno is a very pleasant 79-year-old woman with history of pain and disability in her shoulder.  Radiographic evaluation was consistent with endstage glenohumeral arthritis.  After discussion of risks and benefits of surgical versus nonsurgical management, she elected to proceed with surgical remediation.  She understood that we would make a decision about reverse versus anatomic total shoulder arthroplasty based on the condition of her rotator cuff  intraoperatively.            Hospital Course:   Patient did well post operatively.  Transitioned from iv medication to oral meds.  Tolerated diet, resumed normal bowel and bladder function.  Was seen by PT/OT prior to discharge.  Hemoglobin stable. Drain discontinued on second pod day. Pt deemed appropriate to discharge on pod #2.         Medications Prior to Admission:     Medications Prior to Admission   Medication Sig Dispense Refill Last Dose     aspirin 81 MG EC tablet Take 81 mg by mouth daily    Past Week at Unknown time     atorvastatin (LIPITOR) 20 MG tablet Take 20 mg by mouth At Bedtime    9/10/2019 at 2200     betamethasone dipropionate (DIPROSONE) 0.05 % external cream Apply topically 2 times daily as needed (itchy and rashy areas of back)   Past Week at Unknown time      clobetasol (TEMOVATE) 0.05 % external solution Apply topically daily as needed (Seborrheic dermatitis)   Past Week at Unknown time     clotrimazole (LOTRIMIN) 1 % external cream Apply topically 2 times daily as needed   Past Week at Unknown time     estradiol (ESTRACE) 0.1 MG/GM vaginal cream Place 2 g vaginally daily as needed (after showers)    Past Month at Unknown time     ibuprofen (ADVIL/MOTRIN) 200 MG tablet Take 400 mg by mouth 2 times daily    Past Week at Unknown time     ketoconazole (NIZORAL) 2 % external shampoo Apply topically daily as needed (Seborrheic dermatitis)   10 Past Week at Unknown time     ketotifen (ZADITOR/REFRESH ANTI-ITCH) 0.025 % ophthalmic solution Place 1 drop into both eyes daily as needed for itching   Past Week at Unknown time     [DISCONTINUED] acetaminophen (TYLENOL) 500 MG tablet Take 500 mg by mouth 2 times daily    Past Week at Unknown time     [DISCONTINUED] diltiazem ER (DILT-XR) 180 MG 24 hr capsule Take 180 mg by mouth At Bedtime    9/10/2019 at 2200            Discharge Medications:        Review of your medicines      START taking      Dose / Directions   oxyCODONE 5 MG tablet  Commonly known as:  ROXICODONE      Dose:  2.5-5 mg  Take 0.5-1 tablets (2.5-5 mg) by mouth every 4 hours as needed for moderate to severe pain  Quantity:  35 tablet  Refills:  0     senna-docusate 8.6-50 MG tablet  Commonly known as:  SENOKOT-S/PERICOLACE      Dose:  1 tablet  Take 1 tablet by mouth 2 times daily  Quantity:  20 tablet  Refills:  0        CONTINUE these medicines which may have CHANGED, or have new prescriptions. If we are uncertain of the size of tablets/capsules you have at home, strength may be listed as something that might have changed.      Dose / Directions   acetaminophen 325 MG tablet  Commonly known as:  TYLENOL  This may have changed:      medication strength    how much to take    how to take this    when to take this    additional instructions      Continue 975 mg  every 8 hours for three days, then reduce to 650 mg every 4 hours as needed for pain. .  Quantity:  1 Bottle  Refills:  0     diltiazem  MG 24 hr capsule  Commonly known as:  DILT-XR  This may have changed:      medication strength    how much to take  Used for:  Essential hypertension      Dose:  120 mg  Take 1 capsule (120 mg) by mouth At Bedtime for 7 days  Quantity:  7 capsule  Refills:  0        CONTINUE these medicines which have NOT CHANGED      Dose / Directions   aspirin 81 MG EC tablet      Dose:  81 mg  Take 81 mg by mouth daily  Refills:  0     atorvastatin 20 MG tablet  Commonly known as:  LIPITOR      Dose:  20 mg  Take 20 mg by mouth At Bedtime  Refills:  0     betamethasone dipropionate 0.05 % external cream  Commonly known as:  DIPROSONE      Apply topically 2 times daily as needed (itchy and rashy areas of back)  Refills:  0     clobetasol 0.05 % external solution  Commonly known as:  TEMOVATE      Apply topically daily as needed (Seborrheic dermatitis)  Refills:  0     clotrimazole 1 % external cream  Commonly known as:  LOTRIMIN      Apply topically 2 times daily as needed  Refills:  0     estradiol 0.1 MG/GM vaginal cream  Commonly known as:  ESTRACE      Dose:  2 g  Place 2 g vaginally daily as needed (after showers)  Refills:  0     ibuprofen 200 MG tablet  Commonly known as:  ADVIL/MOTRIN      Dose:  400 mg  Take 400 mg by mouth 2 times daily  Refills:  0     ketoconazole 2 % external shampoo  Commonly known as:  NIZORAL      Apply topically daily as needed (Seborrheic dermatitis)  Refills:  10     ketotifen 0.025 % ophthalmic solution  Commonly known as:  ZADITOR/REFRESH ANTI-ITCH      Dose:  1 drop  Place 1 drop into both eyes daily as needed for itching  Refills:  0           Where to get your medicines      These medications were sent to [facility] [place], [place] - [address]  [address] [address]    Phone:  [phone]      acetaminophen 325 MG tablet    oxyCODONE 5 MG tablet    senna-docusate 8.6-50 MG tablet     These medications were sent to [id number] - [place], [place] - [address] AT [facility] [address]  [address], [place] [address]    Phone:  [phone]     diltiazem  MG 24 hr capsule                 Pending Results at Discharge:   None         Discharge Instructions:     Discharge Procedure Orders   Home care nursing referral   Referral Priority: Routine Referral Type: Home Health Therapies & Aides   Number of Visits Requested: 1     Home Care OT Referral for Hospital Discharge   Referral Priority: Routine   Number of Visits Requested: 1     MD face to face encounter   Order Comments: Documentation of Face to Face and Certification for Home Health Services    I certify that patient: Jocelin Moreno is under my care and that I, or a nurse practitioner or physician's assistant working with me, had a face-to-face encounter that meets the physician face-to-face encounter requirements with this patient on: 9/12/2019.    This encounter with the patient was in whole, or in part, for the following medical condition, which is the primary reason for home health care: s/p shoulder replacement.    I certify that, based on my findings, the following services are medically necessary home health services: Nursing and Occupational Therapy.    My clinical findings support the need for the above services because: Nurse is needed: For complex aftercare of surgical procedures because the patient needs instruction and cannot perform care on their own due to: shoulder replacement. and To assess status after changes in medications or other medical regimen.. and Occupational Therapy Services are needed to assess and treat cognitive ability and address ADL safety due to impairment in function s/p shoulder surgery.    Further, I certify that my clinical findings support that this patient is  homebound (i.e. absences from home require considerable and taxing effort and are for medical reasons or Alevism services or infrequently or of short duration when for other reasons) because: Requires assistance of another person or specialized equipment to access medical services because patient: Range of motion limitations prevents ability to exit home safely...    Based on the above findings. I certify that this patient is confined to the home and needs intermittent skilled nursing care, physical therapy and/or speech therapy.  The patient is under my care, and I have initiated the establishment of the plan of care.  This patient will be followed by a physician who will periodically review the plan of care.  Physician/Provider to provide follow up care: Naveed Flood    Attending [facility] physician (the Medicare certified [place] provider): Lonnie Scales*  Physician Signature: See electronic signature associated with these discharge orders.  Date: 9/12/2019     Reason for your hospital stay   Order Comments: You had shoulder surgery     Follow Up and recommended labs and tests   Order Comments: Follow up with Dr Scales in two weeks. If you see him [place], call the office at [phone] to schedule an appointment if you have not already done so. If you see him at [facility] call [phone] to schedule that appoinment..     Activity   Order Comments: Your activity upon discharge: as tolerated, sling at all times. May remove for hygiene or dressing. No active or passive range of motion to your shoulder . Elbow and wrist motions as taught.     Order Specific Question Answer Comments   Is discharge order? Yes      When to contact your care team   Order Comments: Call your physician for fevers greater than 101.5, chills, increased pain, redness, swelling or discharge at the surgical site. During regular business hours call Dr Scales's office and request to speak with his nurse or the triage nurses. If you see him  at [facility] call [phone]. If you see him at [facility] call [phone]. After hours or on weekends call the hospital  at [phone] and ask to speak with the resident on call.     Wound care and dressings   Order Comments: You have a brown dressing on which should remain in place 5 days. You may shower over this dressing. After 5 days you may remove it. Underneath there may be steri-strips. Leave these in place. You may shower with your wound un covered as long as it is clean and dry. Do not scrub your wound. You may leave your wound uncovered as long as it is clean and dry. Notify your physician if you notice any drainage.  .     2 weeks with JAKY Hernandez, CNP  Department of Orthopedic Surgery  [facility]  [phone]

## 2019-09-13 NOTE — PLAN OF CARE
VS: Temp: 96.9  F (36.1  C) Temp src: Oral BP: 136/47 Pulse: 69 Heart Rate: 57 Resp: 16    O2: SpO2: 97 % O2 Device: None (Room air)      Output: Urine output adequate   Last BM: 9/10   Activity: Up with SBA in room   Skin: Intact - incision   Pain: Managed with scheduled Toradol, Tylenol, and PRN 5mg Oxycodone. Ice packs & repositioning   CMS: Intact - denies numbness or tingling   Dressing: CDI   Diet: Regular diet, well tolerated. No N/V reported   LDA: PIV, SL. Hemovac   Equipment: PCDs, shoulder immobilizer   Plan: Discharge home tomorrow   Additional Info:

## 2019-09-13 NOTE — PROGRESS NOTES
"Orthopaedic Surgery Progress Note     Dx: R shoulder end stage glenohumeral arthritis   Tx: R shoulder reverse total shoulder arthroplasty    E: No acute events overnight.    S: Pain well controlled, using oxycodone overnight. Given Toradol IV with resolution of pain   O:   /46 (BP Location: Left arm)   Pulse 72   Temp 97.8  F (36.6  C) (Oral)   Resp 16   Ht 1.626 m (5' 4\")   Wt 75.4 kg (166 lb 3.6 oz)   SpO2 98%   BMI 28.53 kg/m      Drain: minimal overnight    Exam:  Gen: NAD, A/O x 3  Resp: Comfortable, non-labored breathing  RUE:  -Wound dressed, c/d/i  -Sens: SILT m/r/u/ax  -Motor: 4/5 , io, epl, fpl  -Vasc: 2+ pulses, wwp, brisk cap refill      Recent Labs   Lab 09/13/19  0632 09/11/19  0720     --    POTASSIUM 4.1  --    CHLORIDE 109  --    CO2 26  --    BUN 23  --    CR 0.58  --    GLC 95 99   MAG 2.0  --      Recent Labs   Lab 09/12/19  0537   HGB 10.6*         Impression: 79 year old female s/p on reverse total shoulder doing well.     Plan:  - Activity: As tolerated  - Antibiotics: complete   - Diet: resume usual pre op diet.  - DVT prophylaxis: mechanical only  - Wound Care: Aquacel x 5 days.  - Drain: will discharge when less than 30 ml<shift  - Pain management: PNB/Oral pain medications, judicious use   - Physical Therapy:   - Occupational Therapy: No active or passive range of motion. Elbow and wrist motion okay   - Dispo: Home today.  - Follow up:  Two weeks with Dr Scales  Future Appointments   Date Time Provider Department Center   9/12/2019  9:30 AM Held, BRISA [facility]   9/12/2019  2:15 PM Held, BR[facility]   9/12/2019  7:00 PM UR PT OVERFLOW [facility]     discussed with Dr Loyda Rosario, APRN, CNP  Department of Orthopedic Surgery  [facility]  [phone]    For any questions regarding this patient please page me at the above number prior to contacting the ortho resident on call.        "

## 2019-09-16 ENCOUNTER — PATIENT OUTREACH (OUTPATIENT)
Dept: CARE COORDINATION | Facility: CLINIC | Age: 79
End: 2019-09-16

## 2019-09-16 LAB — INTERPRETATION ECG - MUSE: NORMAL

## 2019-09-16 NOTE — PROGRESS NOTES
Patient is having concerns about her brace   She will have the home health care nurse look at it tomorrow and will call with concerns

## (undated) DEVICE — SUCTION IRR SYSTEM W/O TIP INTERPULSE HANDPIECE 0210-100-000

## (undated) DEVICE — COVER CAMERA IN-LIGHT DISP LT-C02

## (undated) DEVICE — SOL NACL 0.9% IRRIG 1000ML BOTTLE 2F7124

## (undated) DEVICE — SU ETHIBOND 1 CTX CR 8/18" CX30D

## (undated) DEVICE — SPONGE COTTONOID 1/2X1" 80-1402

## (undated) DEVICE — DRAPE POUCH INSTRUMENT 1018

## (undated) DEVICE — DRAPE IOBAN INCISE 23X17" 6650EZ

## (undated) DEVICE — STRAP KNEE/BODY 31143004

## (undated) DEVICE — BRUSH SURGICAL SCRUB W/4% CHLORHEXIDINE GLUCONATE SOL 4458A

## (undated) DEVICE — ESU PENCIL W/HOLSTER E2350H

## (undated) DEVICE — DRSG TEGADERM 4X4 3/4" 1626W

## (undated) DEVICE — POSITIONER ARMBOARD FOAM 1PAIR LF FP-ARMB1

## (undated) DEVICE — SYR BULB IRRIG 50ML LATEX FREE 0035280

## (undated) DEVICE — DRAPE U-DRAPE 1015NSD NON-STERILE

## (undated) DEVICE — LINEN ORTHO PACK 5446

## (undated) DEVICE — REVERSE SHOULDER STEINMANN PIN

## (undated) DEVICE — SOL HYDROGEN PEROXIDE 3% 4OZ BOTTLE F0010

## (undated) DEVICE — GLOVE PROTEXIS POWDER FREE 8.5 ORTHOPEDIC 2D73ET85

## (undated) DEVICE — LINEN DRAPE 54X72" 5467

## (undated) DEVICE — SU ETHIBOND 2 V-37 4X30" MX69G

## (undated) DEVICE — BLADE SAW SAGITTAL STRK 20.7X85X0.89MM 2108-109-000S13

## (undated) DEVICE — ESU CLEANER TIP 31142717

## (undated) DEVICE — PACK SET-UP STD 9102

## (undated) DEVICE — RESTRAINT LIMB HOLDER ANKLE/WRIST FOAM W/QUICK RELEASE 2533

## (undated) DEVICE — DRSG DRAIN 4X4" 7086

## (undated) DEVICE — GLOVE PROTEXIS W/NEU-THERA 8.5  2D73TE85

## (undated) DEVICE — SU VICRYL 2-0 CT-1 27" UND J259H

## (undated) DEVICE — SU FIBERWIRE 2 38" T-8 NDL  AR-7206

## (undated) DEVICE — DRAPE U-POUCH 34X29" 1067

## (undated) DEVICE — BONE CLEANING TIP INTERPULSE  0210-010-000

## (undated) DEVICE — ESU ELEC NDL 1" E1552

## (undated) DEVICE — CLIP APPLIER 11" MED LIGACLIP MCM30

## (undated) DEVICE — SU VICRYL 0 CT-1 27" UND J260H

## (undated) DEVICE — ESU GROUND PAD ADULT W/CORD E7507

## (undated) DEVICE — Device

## (undated) DEVICE — SU MONOCRYL 3-0 PS-1 27" Y936H

## (undated) DEVICE — DRSG AQUACEL AG 3.5X9.75" HYDROFIBER 412011

## (undated) DEVICE — SUCTION MANIFOLD DORNOCH ULTRA CART UL-CL500

## (undated) DEVICE — SU SILK 2-0 TIE 12X30" A305H

## (undated) DEVICE — LINEN TOWEL PACK X5 5464

## (undated) DEVICE — SPONGE LAP 18X18" X8435

## (undated) DEVICE — IMM KIT SHOULDER TMAX MASK FACE 7210559

## (undated) DEVICE — IMM KIT SHOULDER STABILIZATION 7210573

## (undated) DEVICE — SOL WATER IRRIG 1000ML BOTTLE 2F7114

## (undated) DEVICE — 2.7 MM PERIPHERAL SCREW DRILL BIT

## (undated) DEVICE — DRSG STERI STRIP 1/2X4" R1547

## (undated) DEVICE — 3.2 MM CENTRAL SCREW DRILL BIT

## (undated) RX ORDER — FENTANYL CITRATE 50 UG/ML
INJECTION, SOLUTION INTRAMUSCULAR; INTRAVENOUS
Status: DISPENSED
Start: 2019-09-11

## (undated) RX ORDER — DEXAMETHASONE SODIUM PHOSPHATE 4 MG/ML
INJECTION, SOLUTION INTRA-ARTICULAR; INTRALESIONAL; INTRAMUSCULAR; INTRAVENOUS; SOFT TISSUE
Status: DISPENSED
Start: 2019-09-11

## (undated) RX ORDER — CEFAZOLIN SODIUM 1 G/3ML
INJECTION, POWDER, FOR SOLUTION INTRAMUSCULAR; INTRAVENOUS
Status: DISPENSED
Start: 2019-09-11

## (undated) RX ORDER — GABAPENTIN 100 MG/1
CAPSULE ORAL
Status: DISPENSED
Start: 2019-09-11

## (undated) RX ORDER — PROPOFOL 10 MG/ML
INJECTION, EMULSION INTRAVENOUS
Status: DISPENSED
Start: 2019-09-11

## (undated) RX ORDER — CEFAZOLIN SODIUM 2 G/100ML
INJECTION, SOLUTION INTRAVENOUS
Status: DISPENSED
Start: 2019-09-11

## (undated) RX ORDER — GLYCOPYRROLATE 0.2 MG/ML
INJECTION INTRAMUSCULAR; INTRAVENOUS
Status: DISPENSED
Start: 2019-09-11

## (undated) RX ORDER — PHENYLEPHRINE HCL IN 0.9% NACL 1 MG/10 ML
SYRINGE (ML) INTRAVENOUS
Status: DISPENSED
Start: 2019-09-11

## (undated) RX ORDER — ONDANSETRON 2 MG/ML
INJECTION INTRAMUSCULAR; INTRAVENOUS
Status: DISPENSED
Start: 2019-09-11

## (undated) RX ORDER — BUPIVACAINE HYDROCHLORIDE 2.5 MG/ML
INJECTION, SOLUTION EPIDURAL; INFILTRATION; INTRACAUDAL
Status: DISPENSED
Start: 2019-09-11

## (undated) RX ORDER — LIDOCAINE HYDROCHLORIDE 20 MG/ML
INJECTION, SOLUTION EPIDURAL; INFILTRATION; INTRACAUDAL; PERINEURAL
Status: DISPENSED
Start: 2019-09-11

## (undated) RX ORDER — ACETAMINOPHEN 325 MG/1
TABLET ORAL
Status: DISPENSED
Start: 2019-09-11